# Patient Record
Sex: FEMALE | Race: WHITE | Employment: FULL TIME | URBAN - METROPOLITAN AREA
[De-identification: names, ages, dates, MRNs, and addresses within clinical notes are randomized per-mention and may not be internally consistent; named-entity substitution may affect disease eponyms.]

---

## 2017-09-29 ENCOUNTER — HOSPITAL ENCOUNTER (OUTPATIENT)
Dept: MRI IMAGING | Age: 32
Discharge: HOME | End: 2017-09-29
Attending: INTERNAL MEDICINE
Payer: COMMERCIAL

## 2017-09-29 VITALS — BODY MASS INDEX: 31.12 KG/M2 | WEIGHT: 220 LBS

## 2017-09-29 DIAGNOSIS — R63.5 ABNORMAL WEIGHT GAIN: ICD-10-CM

## 2017-09-29 DIAGNOSIS — D35.2 PITUITARY ADENOMA (HCC): ICD-10-CM

## 2017-09-29 DIAGNOSIS — R53.83 FATIGUE: ICD-10-CM

## 2017-09-29 PROCEDURE — 70553 MRI BRAIN STEM W/O & W/DYE: CPT

## 2017-09-29 PROCEDURE — 74011250636 HC RX REV CODE- 250/636: Performed by: INTERNAL MEDICINE

## 2017-09-29 PROCEDURE — A9577 INJ MULTIHANCE: HCPCS | Performed by: INTERNAL MEDICINE

## 2017-09-29 RX ADMIN — GADOBENATE DIMEGLUMINE 20 ML: 529 INJECTION, SOLUTION INTRAVENOUS at 09:20

## 2018-11-04 ENCOUNTER — HOSPITAL ENCOUNTER (EMERGENCY)
Age: 33
Discharge: HOME | End: 2018-11-04
Attending: EMERGENCY MEDICINE | Admitting: EMERGENCY MEDICINE
Payer: COMMERCIAL

## 2018-11-04 VITALS
SYSTOLIC BLOOD PRESSURE: 111 MMHG | TEMPERATURE: 98.3 F | OXYGEN SATURATION: 100 % | HEART RATE: 69 BPM | RESPIRATION RATE: 16 BRPM | DIASTOLIC BLOOD PRESSURE: 70 MMHG

## 2018-11-04 DIAGNOSIS — R00.2 PALPITATION: Primary | ICD-10-CM

## 2018-11-04 LAB
ALBUMIN SERPL-MCNC: 3.9 G/DL (ref 3.5–5)
ALBUMIN/GLOB SERPL: 1.1 {RATIO} (ref 1.1–2.2)
ALP SERPL-CCNC: 47 U/L (ref 45–117)
ALT SERPL-CCNC: 18 U/L (ref 12–78)
ANION GAP SERPL CALC-SCNC: 8 MMOL/L (ref 5–15)
APPEARANCE UR: ABNORMAL
AST SERPL-CCNC: 11 U/L (ref 15–37)
BACTERIA URNS QL MICRO: ABNORMAL /HPF
BASOPHILS # BLD: 0 K/UL (ref 0–0.1)
BASOPHILS NFR BLD: 0 % (ref 0–1)
BILIRUB SERPL-MCNC: 0.5 MG/DL (ref 0.2–1)
BILIRUB UR QL: NEGATIVE
BUN SERPL-MCNC: 14 MG/DL (ref 6–20)
BUN/CREAT SERPL: 16 (ref 12–20)
CALCIUM SERPL-MCNC: 8.6 MG/DL (ref 8.5–10.1)
CHLORIDE SERPL-SCNC: 107 MMOL/L (ref 97–108)
CO2 SERPL-SCNC: 25 MMOL/L (ref 21–32)
COLOR UR: ABNORMAL
CREAT SERPL-MCNC: 0.87 MG/DL (ref 0.55–1.02)
DIFFERENTIAL METHOD BLD: NORMAL
EOSINOPHIL # BLD: 0.2 K/UL (ref 0–0.4)
EOSINOPHIL NFR BLD: 3 % (ref 0–7)
EPITH CASTS URNS QL MICRO: ABNORMAL /LPF
ERYTHROCYTE [DISTWIDTH] IN BLOOD BY AUTOMATED COUNT: 12.3 % (ref 11.5–14.5)
GLOBULIN SER CALC-MCNC: 3.5 G/DL (ref 2–4)
GLUCOSE SERPL-MCNC: 83 MG/DL (ref 65–100)
GLUCOSE UR STRIP.AUTO-MCNC: NEGATIVE MG/DL
HCG UR QL: NEGATIVE
HCT VFR BLD AUTO: 36 % (ref 35–47)
HGB BLD-MCNC: 11.7 G/DL (ref 11.5–16)
HGB UR QL STRIP: ABNORMAL
IMM GRANULOCYTES # BLD: 0 K/UL (ref 0–0.04)
IMM GRANULOCYTES NFR BLD AUTO: 0 % (ref 0–0.5)
KETONES UR QL STRIP.AUTO: NEGATIVE MG/DL
LEUKOCYTE ESTERASE UR QL STRIP.AUTO: NEGATIVE
LYMPHOCYTES # BLD: 2.6 K/UL (ref 0.8–3.5)
LYMPHOCYTES NFR BLD: 43 % (ref 12–49)
MCH RBC QN AUTO: 29.8 PG (ref 26–34)
MCHC RBC AUTO-ENTMCNC: 32.5 G/DL (ref 30–36.5)
MCV RBC AUTO: 91.6 FL (ref 80–99)
MONOCYTES # BLD: 0.4 K/UL (ref 0–1)
MONOCYTES NFR BLD: 7 % (ref 5–13)
NEUTS SEG # BLD: 2.9 K/UL (ref 1.8–8)
NEUTS SEG NFR BLD: 48 % (ref 32–75)
NITRITE UR QL STRIP.AUTO: NEGATIVE
NRBC # BLD: 0 K/UL (ref 0–0.01)
NRBC BLD-RTO: 0 PER 100 WBC
PH UR STRIP: 5 [PH] (ref 5–8)
PLATELET # BLD AUTO: 187 K/UL (ref 150–400)
PMV BLD AUTO: 10.5 FL (ref 8.9–12.9)
POTASSIUM SERPL-SCNC: 3.4 MMOL/L (ref 3.5–5.1)
PROT SERPL-MCNC: 7.4 G/DL (ref 6.4–8.2)
PROT UR STRIP-MCNC: NEGATIVE MG/DL
RBC # BLD AUTO: 3.93 M/UL (ref 3.8–5.2)
RBC #/AREA URNS HPF: ABNORMAL /HPF (ref 0–5)
SODIUM SERPL-SCNC: 140 MMOL/L (ref 136–145)
SP GR UR REFRACTOMETRY: >1.03 (ref 1–1.03)
TROPONIN I SERPL-MCNC: <0.05 NG/ML
UR CULT HOLD, URHOLD: NORMAL
UROBILINOGEN UR QL STRIP.AUTO: 0.2 EU/DL (ref 0.2–1)
WBC # BLD AUTO: 6.1 K/UL (ref 3.6–11)
WBC URNS QL MICRO: ABNORMAL /HPF (ref 0–4)

## 2018-11-04 PROCEDURE — 36415 COLL VENOUS BLD VENIPUNCTURE: CPT | Performed by: EMERGENCY MEDICINE

## 2018-11-04 PROCEDURE — 81001 URINALYSIS AUTO W/SCOPE: CPT | Performed by: EMERGENCY MEDICINE

## 2018-11-04 PROCEDURE — 80053 COMPREHEN METABOLIC PANEL: CPT | Performed by: EMERGENCY MEDICINE

## 2018-11-04 PROCEDURE — 81025 URINE PREGNANCY TEST: CPT

## 2018-11-04 PROCEDURE — 93005 ELECTROCARDIOGRAM TRACING: CPT

## 2018-11-04 PROCEDURE — 99284 EMERGENCY DEPT VISIT MOD MDM: CPT

## 2018-11-04 PROCEDURE — 84484 ASSAY OF TROPONIN QUANT: CPT | Performed by: EMERGENCY MEDICINE

## 2018-11-04 PROCEDURE — 85025 COMPLETE CBC W/AUTO DIFF WBC: CPT | Performed by: EMERGENCY MEDICINE

## 2018-11-04 RX ORDER — IBUPROFEN 400 MG/1
400 TABLET ORAL
Status: DISCONTINUED | OUTPATIENT
Start: 2018-11-04 | End: 2018-11-05 | Stop reason: HOSPADM

## 2018-11-05 LAB
ATRIAL RATE: 87 BPM
CALCULATED P AXIS, ECG09: 58 DEGREES
CALCULATED R AXIS, ECG10: 96 DEGREES
CALCULATED T AXIS, ECG11: 48 DEGREES
DIAGNOSIS, 93000: NORMAL
P-R INTERVAL, ECG05: 168 MS
Q-T INTERVAL, ECG07: 380 MS
QRS DURATION, ECG06: 94 MS
QTC CALCULATION (BEZET), ECG08: 457 MS
VENTRICULAR RATE, ECG03: 87 BPM

## 2018-11-05 NOTE — ED PROVIDER NOTES
27 y.o. female with past medical history significant for HTN, migraines, panic attacks, anemia, ADHD, who presents ambulatory to the ED with cc of palpitations. Pt reports 4-week history of intermittent palpitations described as a \"flip-flop\" sensation lasting \"for a second\" and followed by \"a gasping breath. \" She states today the palpitations increased in frequency, with most recent occurrence during EKG reading in the ED. Pt denies high intake of caffeine, noting she has had 1 cup of decaf coffee today. She endorses use of Vyvanse for ADHD x 1 year. At present, pt denies any palpitations but endorses mild shortness of breath described as \"I need to take a deeper breath while breathing. \" She adds she has been feeling thirsty recently despite intake of water. Of note, pt was seen by cardiology \"last year or the previous year\" and was placed on a 30-day event monitor because her son has a \"heart defect. \" She states she was informed by the cardiologist that the results were negative and that \"I'm stressed. \" Pt denies use of birth control, Mirena, or other hormone drugs. Pt specifically denies any chest pain, leg pain, leg swelling, or abdominal pain. There are no other acute medical concerns at this time. Social Hx: Never Tobacco use, denies EtOH use Significant FMHx: son has \"heart defect\" PCP: Rosalind Meza MD 
 
Note written by Nemo Stern, as dictated by Johnny Darden MD 7:30 PM 
 
 
 
The history is provided by the patient. No  was used. Past Medical History:  
Diagnosis Date  Abnormal Pap smear 2004  
 colposcopy done and they have been normal since  Abnormal Papanicolaou smear of cervix 2004  
 colpo, WNL after  ADHD  Anemia NEC   
 has been on iron in the past, but currently not taking extra iron  Essential hypertension   
 pih at 36 weeks with last pregnancy. IOL at 38 weeks  Migraines  Panic attacks  Postpartum depression  Psychiatric problem   
 anxiety/depression, denies taking meds at this time but states has taken Lexipro and Cellexa in the past.  
 
 
Past Surgical History:  
Procedure Laterality Date  HX OTHER SURGICAL  2001  
 wisdom teeth ext. [address] R leg, bone cyst removed Family History:  
Problem Relation Age of Onset  Cancer Maternal Grandfather 79  
     pancreatic  Anxiety Father  Depression Father  Hypertension Father  Arthritis-osteo Father  High Cholesterol Maternal Grandmother  Arthritis-osteo Maternal Grandmother  Cancer Paternal Grandmother   
     lung  Heart Disease Paternal Grandfather  Cancer Maternal Aunt   
     breast  
 
 
Social History Socioeconomic History  Marital status:  Spouse name: Not on file  Number of children: Not on file  Years of education: Not on file  Highest education level: Not on file Social Needs  Financial resource strain: Not on file  Food insecurity - worry: Not on file  Food insecurity - inability: Not on file  Transportation needs - medical: Not on file  Transportation needs - non-medical: Not on file Occupational History  Not on file Tobacco Use  Smoking status: Never Smoker  Smokeless tobacco: Never Used Substance and Sexual Activity  Alcohol use: No  
 Drug use: Not on file  Sexual activity: Yes  
  Partners: Male Birth control/protection: None Other Topics Concern  Not on file Social History Narrative  Not on file ALLERGIES: Latex and Morphine Review of Systems Respiratory: Positive for shortness of breath. Cardiovascular: Positive for palpitations. Negative for chest pain and leg swelling. Gastrointestinal: Negative for abdominal pain. Musculoskeletal: Negative for myalgias. All other systems reviewed and are negative. Vitals:  
 11/04/18 1903 Pulse: (!) 120  
 SpO2: 100% Physical Exam  
Constitutional: She is oriented to person, place, and time. She appears well-developed and well-nourished. No distress. HENT:  
Head: Normocephalic and atraumatic. Eyes: Conjunctivae are normal. No scleral icterus. Neck: Neck supple. No tracheal deviation present. Cardiovascular: Normal rate, regular rhythm, normal heart sounds and intact distal pulses. Exam reveals no gallop and no friction rub. No murmur heard. Pulmonary/Chest: Effort normal and breath sounds normal. She has no wheezes. She has no rales. Abdominal: Soft. She exhibits no distension. There is no tenderness. There is no rebound and no guarding. Musculoskeletal: She exhibits no edema. Neurological: She is alert and oriented to person, place, and time. Skin: Skin is warm and dry. No rash noted. Psychiatric: She has a normal mood and affect. Nursing note and vitals reviewed. Note written by Nemo Moss, as dictated by Morgan Dumont MD 7:30 PM 
 
MDM Procedures ED EKG interpretation: 
NSR, rate 87, no ST changes.  
Note written by Nemo Moss, as dictated by Morgan Dumont MD 7:39 PM

## 2018-11-05 NOTE — ED NOTES
MD reviewed discharge instructions and options with patient and patient verbalized understanding. RN reviewed discharge instructions using teachback method. Pt ambulated to exit without difficulty and in no signs of acute distress. Ambulatory out of department. No complaints or needs expressed at this time. Patient was counseled on medications prescribed at discharge. VSS, verbalized relief from most intense pain. Patient to call pcp 
 in the morning for appointment.

## 2018-11-05 NOTE — ED TRIAGE NOTES
Over the last four weeks, pt has been feeling heart palpitations that lead her to cough- \"it feels like a flip flop\". Pt in no visible distress, ambulatory, a&ox3. Pt reports an increase in palpitations this evening. Pt had 1 decaf of coffee today. Pt on Vyvanse for 1 year. Pt denies CP currently, states she feels mildly sob when talking. Pt has had a holter monitor in the past and there were no significant events noted.

## 2018-11-05 NOTE — DISCHARGE INSTRUCTIONS
We hope that we have addressed all of your medical concerns. The examination and treatment you received in the Emergency Department were for an emergent problem and were not intended as complete care. It is important that you follow up with your healthcare provider(s) for ongoing care. If your symptoms worsen or do not improve as expected, and you are unable to reach your usual health care provider(s), you should return to the Emergency Department. Today's healthcare is undergoing tremendous change, and patient satisfaction surveys are one of the many tools to assess the quality of medical care. You may receive a survey from the [employer] regarding your experience in the Emergency Department. I hope that your experience has been completely positive, particularly the medical care that I provided. As such, please participate in the survey; anything less than excellent does not meet my expectations or intentions. formerly [facility] and [address] [place] [address] participate in nationally recognized quality of care measures. If your blood pressure is greater than 120/80, as reported below, we urge that you seek medical care to address the potential of high blood pressure, commonly known as hypertension. Hypertension can be hereditary or can be caused by certain medical conditions, pain, stress, or \"white coat syndrome. \"       Please make an appointment with your health care provider(s) for follow up of your Emergency Department visit. VITALS:   Patient Vitals for the past 8 hrs:   Temp Pulse Resp BP SpO2   11/04/18 1935 98.3 °F (36.8 °C) 83 18 144/90 100 %   11/04/18 1903 -- (!) 120 -- -- 100 %          Thank you for allowing us to provide you with medical care today. We realize that you have many choices for your emergency care needs. Please choose us in the future for any continued health care needs. Hildred Capron Magnant, MD    [address] [facility].   Office: [phone]            Recent Results (from the past 24 hour(s))   CBC WITH AUTOMATED DIFF    Collection Time: 11/04/18  7:37 PM   Result Value Ref Range    WBC 6.1 3.6 - 11.0 K/uL    RBC 3.93 3.80 - 5.20 M/uL    HGB 11.7 11.5 - 16.0 g/dL    HCT 36.0 35.0 - 47.0 %    MCV 91.6 80.0 - 99.0 FL    MCH 29.8 26.0 - 34.0 PG    MCHC 32.5 30.0 - 36.5 g/dL    RDW 12.3 11.5 - 14.5 %    PLATELET 526 748 - 099 K/uL    MPV 10.5 8.9 - 12.9 FL    NRBC 0.0 0  WBC    ABSOLUTE NRBC 0.00 0.00 - 0.01 K/uL    NEUTROPHILS 48 32 - 75 %    LYMPHOCYTES 43 12 - 49 %    MONOCYTES 7 5 - 13 %    EOSINOPHILS 3 0 - 7 %    BASOPHILS 0 0 - 1 %    IMMATURE GRANULOCYTES 0 0.0 - 0.5 %    ABS. NEUTROPHILS 2.9 1.8 - 8.0 K/UL    ABS. LYMPHOCYTES 2.6 0.8 - 3.5 K/UL    ABS. MONOCYTES 0.4 0.0 - 1.0 K/UL    ABS. EOSINOPHILS 0.2 0.0 - 0.4 K/UL    ABS. BASOPHILS 0.0 0.0 - 0.1 K/UL    ABS. IMM. GRANS. 0.0 0.00 - 0.04 K/UL    DF AUTOMATED     METABOLIC PANEL, COMPREHENSIVE    Collection Time: 11/04/18  7:37 PM   Result Value Ref Range    Sodium 140 136 - 145 mmol/L    Potassium 3.4 (L) 3.5 - 5.1 mmol/L    Chloride 107 97 - 108 mmol/L    CO2 25 21 - 32 mmol/L    Anion gap 8 5 - 15 mmol/L    Glucose 83 65 - 100 mg/dL    BUN 14 6 - 20 MG/DL    Creatinine 0.87 0.55 - 1.02 MG/DL    BUN/Creatinine ratio 16 12 - 20      GFR est AA >60 >60 ml/min/1.73m2    GFR est non-AA >60 >60 ml/min/1.73m2    Calcium 8.6 8.5 - 10.1 MG/DL    Bilirubin, total 0.5 0.2 - 1.0 MG/DL    ALT (SGPT) 18 12 - 78 U/L    AST (SGOT) 11 (L) 15 - 37 U/L    Alk.  phosphatase 47 45 - 117 U/L    Protein, total 7.4 6.4 - 8.2 g/dL    Albumin 3.9 3.5 - 5.0 g/dL    Globulin 3.5 2.0 - 4.0 g/dL    A-G Ratio 1.1 1.1 - 2.2     TROPONIN I    Collection Time: 11/04/18  7:37 PM   Result Value Ref Range    Troponin-I, Qt. <0.05 <0.05 ng/mL   URINALYSIS W/MICROSCOPIC    Collection Time: 11/04/18  7:47 PM   Result Value Ref Range    Color YELLOW/STRAW Appearance CLOUDY (A) CLEAR      Specific gravity >1.030 (H) 1.003 - 1.030    pH (UA) 5.0 5.0 - 8.0      Protein NEGATIVE  NEG mg/dL    Glucose NEGATIVE  NEG mg/dL    Ketone NEGATIVE  NEG mg/dL    Bilirubin NEGATIVE  NEG      Blood TRACE (A) NEG      Urobilinogen 0.2 0.2 - 1.0 EU/dL    Nitrites NEGATIVE  NEG      Leukocyte Esterase NEGATIVE  NEG      WBC 0-4 0 - 4 /hpf    RBC 0-5 0 - 5 /hpf    Epithelial cells MANY (A) FEW /lpf    Bacteria 1+ (A) NEG /hpf   URINE CULTURE HOLD SAMPLE    Collection Time: 11/04/18  7:47 PM   Result Value Ref Range    Urine culture hold        URINE ON HOLD IN MICROBIOLOGY DEPT FOR 3 DAYS. IF UNPRESERVED URINE IS SUBMITTED, IT CANNOT BE USED FOR ADDITIONAL TESTING AFTER 24 HRS, RECOLLECTION WILL BE REQUIRED. HCG URINE, QL. - POC    Collection Time: 11/04/18  7:48 PM   Result Value Ref Range    Pregnancy test,urine (POC) NEGATIVE  NEG         No results found.

## 2019-07-01 LAB
ANTIBODY SCREEN, EXTERNAL: NEGATIVE
CHLAMYDIA, EXTERNAL: NEGATIVE
HBSAG, EXTERNAL: NEGATIVE
HIV, EXTERNAL: NONREACTIVE
N. GONORRHEA, EXTERNAL: NEGATIVE
T. PALLIDUM, EXTERNAL: NONREACTIVE
TYPE, ABO & RH, EXTERNAL: NORMAL

## 2020-01-24 ENCOUNTER — HOSPITAL ENCOUNTER (OUTPATIENT)
Age: 35
Setting detail: OBSERVATION
Discharge: HOME | End: 2020-01-24
Attending: OBSTETRICS & GYNECOLOGY | Admitting: OBSTETRICS & GYNECOLOGY
Payer: COMMERCIAL

## 2020-01-24 VITALS
HEIGHT: 70 IN | DIASTOLIC BLOOD PRESSURE: 70 MMHG | BODY MASS INDEX: 37.22 KG/M2 | WEIGHT: 260 LBS | RESPIRATION RATE: 16 BRPM | TEMPERATURE: 98.5 F | HEART RATE: 77 BPM | SYSTOLIC BLOOD PRESSURE: 122 MMHG

## 2020-01-24 PROBLEM — Z34.90 PREGNANCY: Status: ACTIVE | Noted: 2020-01-24

## 2020-01-24 LAB
A1 MICROGLOB PLACENTAL VAG QL: NEGATIVE
CONTROL LINE PRESENT?: NORMAL
DAILY QC (YES/NO)?: YES
EXPIRATION DATE: NORMAL
INTERNAL NEGATIVE CONTROL: NORMAL
KIT LOT NO.: NORMAL
PH, VAGINAL FLUID: NEGATIVE (ref 5–6.1)

## 2020-01-24 PROCEDURE — 99285 EMERGENCY DEPT VISIT HI MDM: CPT

## 2020-01-24 PROCEDURE — 83986 ASSAY PH BODY FLUID NOS: CPT | Performed by: ADVANCED PRACTICE MIDWIFE

## 2020-01-24 PROCEDURE — 84112 EVAL AMNIOTIC FLUID PROTEIN: CPT | Performed by: ADVANCED PRACTICE MIDWIFE

## 2020-01-24 RX ORDER — ZINC GLUCONATE 10 MG
LOZENGE ORAL
COMMUNITY

## 2020-01-24 NOTE — DISCHARGE INSTRUCTIONS
You came to the hospital because you thought you were in labor. This information may help you decide when you need to call your provider and return to the hospital.     Am I in Labor? ?  While each woman may feel contractions differently, these facts may help you decide if you are in true labor. A contraction is a painful tightening of the uterus. It may feel like the baby is sitting up, a bad backache or severe menstrual cramps. Sometimes women have contractions that do not cause cervical change- this is often called \"false labor. \"      SIGNS AND SYMPTOMS OF LABOR   Uterine cramping   Diarrhea   Increase or change in vaginal discharge   Low abdominal pressure   Dull low backache   Feeling like your baby is pushing down    When these symptoms are experienced. .. STOP what you are doing, lie on your side, drink two or three glasses of water or juice, and wait one hour. If the symptoms worsen call your care provider. If the symptoms go away, inform your care provider at the next visit that this happened. In TRUE LABOR contractions: In FALSE LABOR contractions:   * Occur regularly every 5 min or less * Occur irregularly   * Feel stronger and hurt more * Stay the same or space out   * Become stronger with walking * Strength stays the same or they hurt less  when walking   * Pinkish mucus or spotting maybe present          Call your provider if:   Regular, painful contractions every 5 minutes or less for one hour. Time your contractions.   You have a gush of fluid from your vagina.  Vaginal bleeding that is bright red, like a period (it is normal to have spotting after a vaginal exam or intercourse).   Your baby is not moving as much as usual- at least 4 fetal movements in 1 hour after drinking and resting on your side for 1 hour.  Fever 101 or above taken orally. Follow Up Appointment:  {follow up:[id number]} with Dr. Astudillo at the office.       Medications: Continue prenatal medications and/or any new medications    Patient Education        Weeks 34 to 39 of Your Pregnancy: Care Instructions  Your Care Instructions    By now, your baby and your belly have grown quite large. It is almost time to give birth. Your baby's lungs are almost ready to breathe air. The bones in your baby's head are now firm enough to protect it, but soft enough to move down through the birth canal.  You may feel excited, happy, anxious, or scared. You may wonder how you will know if you are in labor or what to expect during labor. Try to be flexible in your expectations of the birth. Because each birth is different, there is no way to know exactly what childbirth will be like for you. This care sheet will help you know what to expect and how to prepare. This may make your childbirth easier. If you haven't already had the Tdap shot during this pregnancy, talk to your doctor about getting it. It will help protect your  against pertussis infection. In the 36th week, most women have a test for group B streptococcus (GBS). GBS is a common bacteria that can live in the vagina and rectum. It can make your baby sick after birth. If you test positive, you will get antibiotics during labor. The medicine will keep your baby from getting the bacteria. Follow-up care is a key part of your treatment and safety. Be sure to make and go to all appointments, and call your doctor if you are having problems. It's also a good idea to know your test results and keep a list of the medicines you take. How can you care for yourself at home? Learn about pain relief choices  · Pain is different for every woman. Talk with your doctor about your feelings about pain. · You can choose from several types of pain relief. These include medicine or breathing techniques, as well as comfort measures. You can use more than one option. · If you choose to have pain medicine during labor, talk to your doctor about your options.  Some medicines lower anxiety and help with some of the pain. Others make your lower body numb so that you won't feel pain. · Be sure to tell your doctor about your pain medicine choice before you start labor or very early in your labor. You may be able to change your mind as labor progresses. · Rarely, a woman is put to sleep by medicine given through a mask or an IV. Labor and delivery  · The first stage of labor has three parts: early, active, and transition. ? Most women have early labor at home. You can stay busy or rest, eat light snacks, drink clear fluids, and start counting contractions. ? When talking during a contraction gets hard, you may be moving to active labor. During active labor, you should head for the hospital if you are not there already. ? You are in active labor when contractions come every 3 to 4 minutes and last about 60 seconds. Your cervix is opening more rapidly. ? If your water breaks, contractions will come faster and stronger. ? During transition, your cervix is stretching, and contractions are coming more rapidly. ? You may want to push, but your cervix might not be ready. Your doctor will tell you when to push. · The second stage starts when your cervix is completely opened and you are ready to push. ? Contractions are very strong to push the baby down the birth canal.  ? You will feel the urge to push. You may feel like you need to have a bowel movement. ? You may be coached to push with contractions. These contractions will be very strong, but you will not have them as often. You can get a little rest between contractions. ? You may be emotional and irritable. You may not be aware of what is going on around you.  ? One last push, and your baby is born. · The third stage is when a few more contractions push out the placenta. This may take 30 minutes or less. · The fourth stage is the welcome recovery. You may feel overwhelmed with emotions and exhausted but alert.  This is a good time to start breastfeeding. Where can you learn more? Go to http://tello-iza.info/. Enter D783 in the search box to learn more about \"Weeks 34 to 36 of Your Pregnancy: Care Instructions. \"  Current as of: May 29, 2019  Content Version: 12.2  © 0889-8205 [employer]. Care instructions adapted under license by [employer] (which disclaims liability or warranty for this information). If you have questions about a medical condition or this instruction, always ask your healthcare professional. [address] any warranty or liability for your use of this information.

## 2020-01-24 NOTE — PROGRESS NOTES
1/24/2020  2:31 AM  Pt arrived to unit from home. Pt oriented to room 8 and placed on EFM. Pt states she had a gush of fluid around 0205. Denies vaginal bleeding, or contractions. Nitrazine negative, amnisure completed. 0310:  Amnisure result negative  0323:  LATASHA Bronson CNM at bedside. Speculum exam completed. 1258:  Reviewed discharge instructions with pt and . All questions answered.

## 2020-01-24 NOTE — H&P
History and Physical    Patient: Tila Alonso MRN: [id number]  SSN: [id number]    YOB: 1985  Age: 37 y.o. Sex: female      Subjective:      Tila Alonso is a 37 y.o. female who  at 29w2d with an SERENITY of 20. She presents to l and d for possible SROM at 0200 this morning. Reports she was sleeping and felt a large gush of fluid. She has continued to feel wet since that time, wore a pad into labor and delivery, it was not saturated on arrival, but moist. Reports fluid has been clear and odorless. Denies ctx and vb, endorses good fetal movement. Pregnancy complicated by history of  (for NRFS), desires . Has had one prior VAVD prior to . Pt with history of son with tricuspid atresia, scans normal for this pregnancy. Pt also with history of PPD, will start medication PP, see therapist. Pt AMA and obese. Anemic- on supplementation. Past Medical History:   Diagnosis Date    Abnormal Pap smear 2004    colposcopy done and they have been normal since    Abnormal Papanicolaou smear of cervix 2004    colpo, WNL after    ADHD     Anemia NEC     has been on iron in the past, but currently not taking extra iron    Essential hypertension     pih at 36 weeks with last pregnancy. IOL at 38 weeks    Migraines     Panic attacks     Postpartum depression     1st pregnancy - no meds    Psychiatric problem     anxiety/depression, denies taking meds at this time but states has taken Lexipro and Cellexa in the past.     Past Surgical History:   Procedure Laterality Date    HX  SECTION      2016    HX OTHER SURGICAL      wisdom teeth ext.     HX OTHER SURGICAL      R leg, bone cyst removed      Family History   Problem Relation Age of Onset    Cancer Maternal Grandfather 79        pancreatic    Anxiety Father     Depression Father     Hypertension Father     Arthritis-osteo Father     High Cholesterol Maternal Grandmother     Arthritis-osteo Maternal Grandmother     Cancer Paternal Grandmother         lung    Heart Disease Paternal Grandfather     Cancer Maternal Aunt         breast     Social History     Tobacco Use    Smoking status: Never Smoker    Smokeless tobacco: Never Used   Substance Use Topics    Alcohol use: No      Prior to Admission medications    Medication Sig Start Date End Date Taking? Authorizing Provider   magnesium 250 mg tab Take  by mouth. Yes Provider, Historical   PNV#16-Iron Fum & PS-FA-OM-3 (CONCEPT DHA) 35-1-200 mg cap Take  by mouth. Yes Provider, Historical   prenatal multivit-ca-min-fe-fa Tab Take  by mouth daily. Indications: PREGNANCY   Yes Provider, Historical   B.infantis-B.ani-B.long-B.bifi (PROBIOTIC 4X) 10-15 mg TbEC Take  by mouth. Yes Provider, Historical        Allergies   Allergen Reactions    Latex Rash    Morphine Shortness of Breath and Rash       Review of Systems:  A comprehensive review of systems was negative except for that written in the History of Present Illness. Objective:     Vitals:    01/24/20 0242   Weight: 117.9 kg (260 lb)   Height: 5' 10\" (1.778 m)        Physical Exam:  GENERAL: alert, cooperative, no distress, appears stated age  LUNG: clear to auscultation bilaterally  HEART: regular rate and rhythm, S1, S2 normal, no murmur, click, rub or gallop  ABDOMEN: soft, non-tender.  Bowel sounds normal. No masses,  no organomegaly, gravid  EXTREMITIES:  extremities normal, atraumatic, no cyanosis or edema  SKIN: Normal.  NEUROLOGIC: negative  PSYCHIATRIC: WNL    SVE: Digital exam deferred- no indication at this time    Sterile speculum exam: No pooling, perineum dry    Amnisure and nitrazine negative    NST: Monitored for 20 minutes, reactive, cat 1, baseline: 140, positive accels, no decels, moderate variability, no ctx    Assessment:     Hospital Problems  Date Reviewed: 12/4/2019          Codes Class Noted POA    Pregnancy ICD-10-CM: Z34.90  ICD-9-CM: V22.2  1/24/2020 Unknown Possible SROM: Ruled out  Previous - desires     Plan:     Admit to observation  NST, Nitrazine, Amnisure  Sterile speculum exam to rule out pooling    Reviewed negative testing for SROM and s/s of SROM. Discussed returning with any new or worsening symptoms. Pt verbalized understanding. Also reviewed s/s of VB, ctx and decreased fetal movement, return with any concerns. Discharged home. Keep next routine OB appt.      Signed By: Leonardo Patel CNM     2020

## 2020-01-31 LAB — GRBS, EXTERNAL: NEGATIVE

## 2020-02-01 ENCOUNTER — HOSPITAL ENCOUNTER (OUTPATIENT)
Age: 35
Setting detail: OBSERVATION
Discharge: HOME | End: 2020-02-01
Attending: OBSTETRICS & GYNECOLOGY | Admitting: OBSTETRICS & GYNECOLOGY
Payer: COMMERCIAL

## 2020-02-01 VITALS
HEART RATE: 90 BPM | RESPIRATION RATE: 16 BRPM | TEMPERATURE: 98.2 F | HEIGHT: 70 IN | BODY MASS INDEX: 37.8 KG/M2 | DIASTOLIC BLOOD PRESSURE: 62 MMHG | SYSTOLIC BLOOD PRESSURE: 127 MMHG | WEIGHT: 264 LBS

## 2020-02-01 PROCEDURE — 99285 EMERGENCY DEPT VISIT HI MDM: CPT

## 2020-02-01 PROCEDURE — 99218 HC RM OBSERVATION: CPT

## 2020-02-01 RX ORDER — FACIAL-BODY WIPES
10 EACH TOPICAL ONCE
Status: DISCONTINUED | OUTPATIENT
Start: 2020-02-01 | End: 2020-02-01 | Stop reason: HOSPADM

## 2020-02-02 NOTE — H&P
History and Physical    Patient: Elizabeth Rader MRN: [id number]  SSN: [id number]    YOB: 1985  Age: 37 y.o. Sex: female      Subjective:      Elizabeth Rader is a 37 y.o. female [id number] at 44w9d with an SERENITY of 20. She presents to labor and delivery for decreased fetal movement. Reports she was home, with her two other children (they were arguing) and she realized she had not felt the fetus move. She drank some cold water, ate a brownie and laid down on the couch. She felt fetal movements, but they were more fluid and butterfly flutter feeling than normal. Reports she usually feels distinct fetal kicks and movements. She was concerned because this is a difference from her normal. Denies LOF, VB and ctx. Reports feeling fetal movement since arrival to labor and delivery. Pregnancy complicated by history of  (for NRFS), desires . Has had one prior VAVD prior to . Pt with history of son with tricuspid atresia, scans normal for this pregnancy. Pt also with history of PPD, will start medication PP, see therapist. Pt AMA and obese. Anemic- on supplementation. Past Medical History:   Diagnosis Date    Abnormal Pap smear 2004    colposcopy done and they have been normal since    Abnormal Papanicolaou smear of cervix 2004    colpo, WNL after    ADHD     Anemia NEC     has been on iron in the past, but currently not taking extra iron    Essential hypertension     pih at 36 weeks with last pregnancy. IOL at 38 weeks    Migraines     Panic attacks     Postpartum depression     1st pregnancy - no meds    Psychiatric problem     anxiety/depression, denies taking meds at this time but states has taken Lexipro and Cellexa in the past.     Past Surgical History:   Procedure Laterality Date    HX  SECTION          HX OTHER SURGICAL      wisdom teeth ext.     HX OTHER SURGICAL      R leg, bone cyst removed      Family History   Problem Relation Age of Onset    Cancer Maternal Grandfather 79        pancreatic    Anxiety Father     Depression Father     Hypertension Father     Arthritis-osteo Father     High Cholesterol Maternal Grandmother     Arthritis-osteo Maternal Grandmother     Cancer Paternal Grandmother         lung    Heart Disease Paternal Grandfather     Cancer Maternal Aunt         breast     Social History     Tobacco Use    Smoking status: Never Smoker    Smokeless tobacco: Never Used   Substance Use Topics    Alcohol use: No      Prior to Admission medications    Medication Sig Start Date End Date Taking? Authorizing Provider   magnesium 250 mg tab Take  by mouth. Yes Provider, Historical   PNV#16-Iron Fum & PS-FA-OM-3 (CONCEPT DHA) 35-1-200 mg cap Take  by mouth. Yes Provider, Historical   prenatal multivit-ca-min-fe-fa Tab Take  by mouth daily. Indications: PREGNANCY   Yes Provider, Historical   B.infantis-B.ani-B.long-B.bifi (PROBIOTIC 4X) 10-15 mg TbEC Take  by mouth. Yes Provider, Historical        Allergies   Allergen Reactions    Latex Rash    Morphine Shortness of Breath and Rash       Review of Systems:  A comprehensive review of systems was negative except for that written in the History of Present Illness. Objective:     Vitals:    02/01/20 1927 02/01/20 1932   BP:  127/62   Pulse:  90   Resp:  16   Temp:  98.2 °F (36.8 °C)   Weight: 119.7 kg (264 lb)    Height: 5' 10\" (1.778 m)         Physical Exam:  GENERAL: alert, cooperative, no distress, appears stated age  LUNG: clear to auscultation bilaterally  HEART: regular rate and rhythm, S1, S2 normal, no murmur, click, rub or gallop  ABDOMEN: soft, non-tender. Bowel sounds normal. No masses,  no organomegaly, gravid  EXTREMITIES:  extremities normal, atraumatic, no cyanosis or edema  SKIN: Normal.  NEUROLOGIC: negative  PSYCHIATRIC: WNL    SVE: Difficult, cervix posterior, large amount of fecal maternal palpable in rectum.     NST: Monitored for more than 20 minutes, reactive, cat 1, baseline 135, positive accels, no decels, moderate variability, uterine irritability noted: improved with PO hydration    Assessment:     Hospital Problems  Date Reviewed: 12/4/2019          Codes Class Noted POA    Pregnancy ICD-10-CM: Z34.90  ICD-9-CM: V22.2  1/24/2020 Unknown            Decreased fetal movement: resolved  Constipation    Plan:     Admit to observation. NST.   SVE per client request.  Reviewed reactive NST and reassurance of fetal wellbeing. Reviewed constipation and need for increased PO hydration, stool softener. Will give bisacodyl suppository to help. Pt verbalized understanding. Discharged home with strict FKC, reviewed LOF, VB and ctx precautions. Return with any concerns. Keep next routine OB appt.      Signed By: Debbie Hamilton CNM     February 1, 2020

## 2020-02-02 NOTE — DISCHARGE INSTRUCTIONS
DECREASED FETAL MOVEMENT DISCHARGE INSTRUCTIONS    Name: Nicko Hardin  YOB: 1985  Primary Diagnosis: Active Problems:    Pregnancy (2020)        Introduction: You came to the hospital because your baby is not moving as much as usual. This information may help you decide when you need to call your care provider and return to the hospital. There are several possible reasons your baby's movements have decreased. Sometimes, the baby is simply asleep. Many times the baby simply needs extra fluids. You can provide this by lying down on your side (to increase blood flow to the womb) and drinking a large glass of fluid. If this does not make the baby move four times in one hour, you need to contact your care provider as soon as possible. General:     none    Diet/Diet Restrictions:      Anything you like/tolerate, Follow your regularly prescribed diet and Drink 8-10 glasses of water each day. Avoid beverages with caffeine. Physical Activity / Restrictions / Safety:     As tolerated. Other:        Discharge Instructions/ Special Treatment/ Home Care Needs:     Call your provider if:   Your baby is not moving as much as usual-at least 4 fetal movements in 1 hour after drinking and resting on your side for 1 hour.  You have regular, painful contractions every 5 minutes or less for one hour. Time your contractions from the beginning of one to the beginning of the next.  You have a gush of fluid or blood from your vagina (it is normal to have spotting after vaginal exam or intercourse).    Other:    labor instructions:    Uterine cramping (menstrual-like cramps, intermittent or constant   Uterine contractions every 10-15 minutes or more frequently   Low abdominal pressure (pelvic pressure)   Dull low backache (intermittent or constant)   Increase or change in vaginal discharge   Feeling that the baby is \"pushing down\"   Abdominal cramping with or without diarrhea  If any of these symptoms are experienced, stop what you are doing, lie down on your side, drink two to three glasses of water and wait one hour. If the symptoms persist or get worse, call your provider. Pain Management:             Discharge Checklist-NURSING TO COMPLETE:     Date and Time of Discharge: Date: 2/1/2020 Time: 8:24 PM    Return of:   Dental Appliance:    Vision:    Hearing Aid:    Jewelry:    Clothing:    Other Valuables:    Valuables sent to safe:      Prescription Given: no  Medication Instruction Sheet(s), including side effects, provided: yes    Accompanied By: Family    Mode of Transportation:    Discharge Disposition: Home    I have had the opportunity to make my options or choices for discharge. I have received and understand these instructions.

## 2020-02-02 NOTE — PROGRESS NOTES
1935 Bedside report received from [address] [place] [address] at bedside. Plan to discharge home. 2045 Discharge home.

## 2020-02-02 NOTE — PROGRESS NOTES
1858 Pt arrived ambulatory from home with decreased fetal movement today. Reports possibly leaking clear fluid as well.

## 2020-02-09 ENCOUNTER — HOSPITAL ENCOUNTER (EMERGENCY)
Age: 35
Discharge: HOME | End: 2020-02-09
Attending: OBSTETRICS & GYNECOLOGY | Admitting: OBSTETRICS & GYNECOLOGY
Payer: COMMERCIAL

## 2020-02-09 VITALS
BODY MASS INDEX: 37.65 KG/M2 | SYSTOLIC BLOOD PRESSURE: 116 MMHG | HEIGHT: 70 IN | TEMPERATURE: 98.8 F | HEART RATE: 102 BPM | WEIGHT: 263 LBS | OXYGEN SATURATION: 99 % | DIASTOLIC BLOOD PRESSURE: 60 MMHG | RESPIRATION RATE: 18 BRPM

## 2020-02-09 LAB
ALBUMIN SERPL-MCNC: 2.6 G/DL (ref 3.5–5)
ALBUMIN/GLOB SERPL: 0.7 {RATIO} (ref 1.1–2.2)
ALP SERPL-CCNC: 99 U/L (ref 45–117)
ALT SERPL-CCNC: 17 U/L (ref 12–78)
ANION GAP SERPL CALC-SCNC: 8 MMOL/L (ref 5–15)
APPEARANCE UR: CLEAR
AST SERPL-CCNC: 36 U/L (ref 15–37)
BACTERIA URNS QL MICRO: NEGATIVE /HPF
BILIRUB SERPL-MCNC: 0.4 MG/DL (ref 0.2–1)
BILIRUB UR QL: NEGATIVE
BUN SERPL-MCNC: 5 MG/DL (ref 6–20)
BUN/CREAT SERPL: 8 (ref 12–20)
CALCIUM SERPL-MCNC: 8.5 MG/DL (ref 8.5–10.1)
CHLORIDE SERPL-SCNC: 110 MMOL/L (ref 97–108)
CO2 SERPL-SCNC: 19 MMOL/L (ref 21–32)
COLOR UR: NORMAL
CREAT SERPL-MCNC: 0.62 MG/DL (ref 0.55–1.02)
EPITH CASTS URNS QL MICRO: NORMAL /LPF
ERYTHROCYTE [DISTWIDTH] IN BLOOD BY AUTOMATED COUNT: 13.5 % (ref 11.5–14.5)
FLUAV AG NPH QL IA: NEGATIVE
FLUBV AG NOSE QL IA: NEGATIVE
GLOBULIN SER CALC-MCNC: 3.7 G/DL (ref 2–4)
GLUCOSE SERPL-MCNC: 99 MG/DL (ref 65–100)
GLUCOSE UR STRIP.AUTO-MCNC: NEGATIVE MG/DL
HCT VFR BLD AUTO: 32.8 % (ref 35–47)
HGB BLD-MCNC: 10.6 G/DL (ref 11.5–16)
HGB UR QL STRIP: NEGATIVE
HYALINE CASTS URNS QL MICRO: NORMAL /LPF (ref 0–5)
KETONES UR QL STRIP.AUTO: NEGATIVE MG/DL
LEUKOCYTE ESTERASE UR QL STRIP.AUTO: NEGATIVE
MCH RBC QN AUTO: 29.4 PG (ref 26–34)
MCHC RBC AUTO-ENTMCNC: 32.3 G/DL (ref 30–36.5)
MCV RBC AUTO: 91.1 FL (ref 80–99)
NITRITE UR QL STRIP.AUTO: NEGATIVE
NRBC # BLD: 0 K/UL (ref 0–0.01)
NRBC BLD-RTO: 0 PER 100 WBC
PH UR STRIP: 6.5 [PH] (ref 5–8)
PLATELET # BLD AUTO: 165 K/UL (ref 150–400)
PMV BLD AUTO: 11.3 FL (ref 8.9–12.9)
POTASSIUM SERPL-SCNC: 4.6 MMOL/L (ref 3.5–5.1)
PROT SERPL-MCNC: 6.3 G/DL (ref 6.4–8.2)
PROT UR STRIP-MCNC: NEGATIVE MG/DL
RBC # BLD AUTO: 3.6 M/UL (ref 3.8–5.2)
RBC #/AREA URNS HPF: NORMAL /HPF (ref 0–5)
SODIUM SERPL-SCNC: 137 MMOL/L (ref 136–145)
SP GR UR REFRACTOMETRY: 1.01 (ref 1–1.03)
UA: UC IF INDICATED,UAUC: NORMAL
UROBILINOGEN UR QL STRIP.AUTO: 0.2 EU/DL (ref 0.2–1)
WBC # BLD AUTO: 11.1 K/UL (ref 3.6–11)
WBC URNS QL MICRO: NORMAL /HPF (ref 0–4)

## 2020-02-09 PROCEDURE — 80053 COMPREHEN METABOLIC PANEL: CPT

## 2020-02-09 PROCEDURE — 85027 COMPLETE CBC AUTOMATED: CPT

## 2020-02-09 PROCEDURE — 81001 URINALYSIS AUTO W/SCOPE: CPT

## 2020-02-09 PROCEDURE — 96360 HYDRATION IV INFUSION INIT: CPT

## 2020-02-09 PROCEDURE — 96374 THER/PROPH/DIAG INJ IV PUSH: CPT

## 2020-02-09 PROCEDURE — 74011250636 HC RX REV CODE- 250/636: Performed by: ADVANCED PRACTICE MIDWIFE

## 2020-02-09 PROCEDURE — 36415 COLL VENOUS BLD VENIPUNCTURE: CPT

## 2020-02-09 PROCEDURE — 87804 INFLUENZA ASSAY W/OPTIC: CPT

## 2020-02-09 PROCEDURE — 96361 HYDRATE IV INFUSION ADD-ON: CPT

## 2020-02-09 PROCEDURE — 99285 EMERGENCY DEPT VISIT HI MDM: CPT

## 2020-02-09 RX ORDER — ONDANSETRON 2 MG/ML
4 INJECTION INTRAMUSCULAR; INTRAVENOUS ONCE
Status: COMPLETED | OUTPATIENT
Start: 2020-02-09 | End: 2020-02-09

## 2020-02-09 RX ORDER — ONDANSETRON 2 MG/ML
INJECTION INTRAMUSCULAR; INTRAVENOUS
Status: DISCONTINUED
Start: 2020-02-09 | End: 2020-02-10 | Stop reason: HOSPADM

## 2020-02-09 RX ORDER — SODIUM CHLORIDE, SODIUM LACTATE, POTASSIUM CHLORIDE, CALCIUM CHLORIDE 600; 310; 30; 20 MG/100ML; MG/100ML; MG/100ML; MG/100ML
500 INJECTION, SOLUTION INTRAVENOUS CONTINUOUS
Status: DISCONTINUED | OUTPATIENT
Start: 2020-02-09 | End: 2020-02-10 | Stop reason: HOSPADM

## 2020-02-09 RX ORDER — SODIUM CHLORIDE, SODIUM LACTATE, POTASSIUM CHLORIDE, CALCIUM CHLORIDE 600; 310; 30; 20 MG/100ML; MG/100ML; MG/100ML; MG/100ML
500 INJECTION, SOLUTION INTRAVENOUS CONTINUOUS
Status: DISCONTINUED | OUTPATIENT
Start: 2020-02-09 | End: 2020-02-09

## 2020-02-09 RX ORDER — ONDANSETRON 4 MG/1
4 TABLET, ORALLY DISINTEGRATING ORAL
Qty: 15 TAB | Refills: 0 | Status: SHIPPED | OUTPATIENT
Start: 2020-02-09 | End: 2020-02-14

## 2020-02-09 RX ADMIN — SODIUM CHLORIDE, SODIUM LACTATE, POTASSIUM CHLORIDE, AND CALCIUM CHLORIDE 1000 ML: 600; 310; 30; 20 INJECTION, SOLUTION INTRAVENOUS at 20:24

## 2020-02-09 RX ADMIN — SODIUM CHLORIDE, SODIUM LACTATE, POTASSIUM CHLORIDE, AND CALCIUM CHLORIDE 500 ML: 600; 310; 30; 20 INJECTION, SOLUTION INTRAVENOUS at 22:31

## 2020-02-09 RX ADMIN — ONDANSETRON 4 MG: 2 INJECTION INTRAMUSCULAR; INTRAVENOUS at 20:47

## 2020-02-09 RX ADMIN — SODIUM CHLORIDE, SODIUM LACTATE, POTASSIUM CHLORIDE, AND CALCIUM CHLORIDE 1000 ML: 600; 310; 30; 20 INJECTION, SOLUTION INTRAVENOUS at 21:24

## 2020-02-10 NOTE — PROGRESS NOTES
Labor Progress Note    S: Patient seen, fetal heart rate and contraction pattern evaluated. Patient reports she feels much better and ready to go home. No episodes of vomiting/diarrhea while on L&D.      Physical Exam:  Patient Vitals for the past 4 hrs:   Temp Pulse Resp BP SpO2   20 2253 98.8 °F (37.1 °C) (!) 102  116/60    20 2146     99 %   20 2053     98 %   20 2004 98.9 °F (37.2 °C) (!) 128 18 127/78          Cervical Exam: deferred  Membranes:  Intact  Uterine Contractions:  Frequency: none  Fetal Heart Rate: Reactive, 150s, +accels, neg decels, moderate variability      Assessment/Plan:    37 y.o.  at 37w6d IUP  Diarrhea  Likely viral gastroenteritis  Cat 1 tracing  GBS negative    P:  D/c home with instructions  Rx sent for Zofran if needed for patient to   Reviewed comfort measures including tylenol & dosing  Encouraged small, frequent, bland meals, & hydration & electrolyte drinks/ broth  Reviewed labor parameters/ precautions, FKCs, warning s/s  Advised if unable to keep food/fluids down to return for IV hydration- can [facility] if prefers for IV hydration  F/u at regular prenatal visit, prn concerns, call prn  All questions asked/answered and agrees with plan    Rod Higginbotham CNM

## 2020-02-10 NOTE — H&P
OB ED Labor and Delivery Admission Note  2020    Patient is a 37 y.o. [id number] at 37w6d with adriane 20 who presents with c/o diarrhea at 2000. She reports she woke up this morning and was feeling some cramping and gas pain and around 1100 began having diarrhea, which she reports about an episode every hour x10 episodes. She reports some lower uterine cramping since, but denies any labor contractions. She does report some nausea, denies vomiting. Had some ravioli this morning and has been drinking ginger ale. She reports she checked her temperature at home and was 100.9 and 100.6, she hasn't taken anything for it, but was normal here. She reports good FM. Denies VB, LOF, changes in vision, RUQ/epigastric pain. Feels like she's starting to get a mild h/a, which she attributes to not eating/drinking much. She reports prenatal care with Dr. Keila Martinez c/b [facility] [place] with normal NIPT screening. Obesity. Anemia for which she is eating iron rich foods. Hx of son (P2) with tricuspid atresia, had normal echo this pregnancy and normal scan at [facility]. Hx of c/s with P2 and desires TOLAC. Also has a hx of anxiety/depression and reports she is not on any meds, but is seeing [facility] Sandrine Caruso at [facility] for counseling and plans treatment postpartum. P1  VAVD for NRFHTs. Girl. 6lbs 4oz. ? IOL for pre-eclampsia per patient. Denies other complications. P2  stat PCS for NRFHTs. Boy. 9lbs. Hypoplastic right heart syndrome (tricuspid atresia). Intraopcystoscopy for hematuria.   SAB x1       PNC: Blood type: A            RH: pos            Hep B: negative            Rubella: immune            GBS status: negative   HIV: non reactive   T pallidum antibodies: negative   GC/CT: negative      OBHX:   OB History        4    Para   2    Term   2            AB   1    Living   2       SAB   1    TAB        Ectopic        Molar        Multiple        Live Births   2                PMH:   Past Medical History:   Diagnosis Date    Abnormal Pap smear 2004    colposcopy done and they have been normal since    Abnormal Papanicolaou smear of cervix 2004    HPV, colpo, WNL after    ADHD     Anemia NEC     has been on iron in the past, but currently not taking extra iron    Essential hypertension     pih at 36 weeks with G1 pregnancy. IOL at 38 weeks    Migraines     Panic attacks     Postpartum depression     1st pregnancy - no meds    Psychiatric problem     anxiety/depression, no meds in pregnancy, seeing therapist, plans for meds PP       PSH:   Past Surgical History:   Procedure Laterality Date    HX  SECTION          HX OTHER SURGICAL      wisdom teeth ext.  HX OTHER SURGICAL      R leg, bone cyst removed       OB/GYN: [id number] at 37w6d with adriane 20. See above    Meds:   Prior to Admission Medications   Prescriptions Last Dose Informant Patient Reported? Taking? B.infantis-B.ani-B.long-B.bifi (PROBIOTIC 4X) 10-15 mg TbEC 2020 at Unknown time  Yes Yes   Sig: Take  by mouth. PNV#16-Iron Fum & PS-FA-OM-3 (CONCEPT DHA) 35-1-200 mg cap 2020 at Unknown time  Yes Yes   Sig: Take  by mouth.   magnesium 250 mg tab 2020 at Unknown time  Yes Yes   Sig: Take  by mouth.   prenatal multivit-ca-min-fe-fa Tab 2020 at Unknown time  Yes Yes   Sig: Take  by mouth daily. Indications: PREGNANCY      Facility-Administered Medications: None       Allergies:    Allergies   Allergen Reactions    Latex Rash    Morphine Shortness of Breath and Rash       Pertinent ROS: Review of Systems - Negative except noted in [facility]:   Family History   Problem Relation Age of Onset    Cancer Maternal Grandfather 79        pancreatic    Anxiety Father     Depression Father     Hypertension Father     Arthritis-osteo Father     High Cholesterol Maternal Grandmother     Arthritis-osteo Maternal Grandmother     Cancer Paternal Grandmother         lung    Heart Disease Paternal Grandfather     Cancer Maternal Aunt breast    Congenital heart defect Son        SH:   Social History     Socioeconomic History    Marital status:      Spouse name: Not on file    Number of children: Not on file    Years of education: Not on file    Highest education level: Not on file   Occupational History    Not on file   Social Needs    Financial resource strain: Not on file    Food insecurity:     Worry: Not on file     Inability: Not on file    Transportation needs:     Medical: Not on file     Non-medical: Not on file   Tobacco Use    Smoking status: Never Smoker    Smokeless tobacco: Never Used   Substance and Sexual Activity    Alcohol use: No    Drug use: Not Currently    Sexual activity: Yes     Partners: Male     Birth control/protection: None   Lifestyle    Physical activity:     Days per week: Not on file     Minutes per session: Not on file    Stress: Not on file   Relationships    Social connections:     Talks on phone: Not on file     Gets together: Not on file     Attends Spiritism service: Not on file     Active member of club or organization: Not on file     Attends meetings of clubs or organizations: Not on file     Relationship status: Not on file    Intimate partner violence:     Fear of current or ex partner: Not on file     Emotionally abused: Not on file     Physically abused: Not on file     Forced sexual activity: Not on file   Other Topics Concern     Service Not Asked    Blood Transfusions Not Asked    Caffeine Concern Not Asked    Occupational Exposure Not Asked   Hettie Model Hazards Not Asked    Sleep Concern Not Asked    Stress Concern Not Asked    Weight Concern Not Asked    Special Diet Not Asked    Back Care Not Asked    Exercise Not Asked    Bike Helmet Not Asked    Seat Belt Not Asked    Self-Exams Not Asked   Social History Narrative    Not on file       OBJECTIVE:    Temp (24hrs), Av.9 °F (37.2 °C), Min:98.9 °F (37.2 °C), Max:98.9 °F (37.2 °C)    Visit Vitals  /78   Pulse (!) 128   Temp 98.9 °F (37.2 °C)   Resp 18   Ht 5' 10\" (1.778 m)   Wt 119.3 kg (263 lb)   SpO2 98%   BMI 37.74 kg/m²       FHR baseline 175s, +accels, neg decels, moderate variability  After fluid bolus baseline decreased to 140s, +accels, neg decels, moderate variability  [place] irritability    Exam:  General: alert & oriented x3  HEENT:  normal   Lungs:  clear  Cor:  RRR  Abdomen:  Soft, non-tender to palpation, gravid, +FM noted on exam                    Clinical EFW [id number]  Cervix:  deferred  Fluid:  intact  Pelvimetry:  AP-good                      Arch- adequate                      Sidewalls- adequate                      Pelvis feels adequate for fetus.   Extremities:  normal, trace BLE edema       Impression: [id number] at 37w6d IUP  Nausea/diarrhea/dehydration  Flu v gastroenteritis   Cat 1 tracing s/p IV hydration  GBS negative  Hx LTCS, desires TOLAC  AMA- normal NIPT  Depression stable off meds  Obesity  Son with tricuspid atresia      Plan:  CBC, CMP, rapid influenza swab, urinalysis with reflex  IV bolus  Continuous monitoring  Reviewed prenatal records   Reviewed plan with patient and partner and agree with plan, all questions asked/answered  Reviewed patient with Dr. Maryann Caceres, agrees with plan     Reyes Baseman, CNM  9:41 PM

## 2020-02-10 NOTE — DISCHARGE INSTRUCTIONS
Am I in Labor? ?  While each woman may feel contractions differently, these facts may help you decide if you are in true labor. A contraction is a painful tightening of the uterus. It may feel like the baby is sitting up, a bad backache or severe menstrual cramps. Sometimes women have contractions that do not cause cervical change- this is often called \"false labor. \"    In TRUE LABOR contractions: In FALSE LABOR contractions:   * Occur regularly every 5 min or less * Occur irregularly   * Feel stronger and hurt more * Stay the same or space out   * Become stronger with walking * Strength stays the same or they hurt less  when walking   * Pinkish mucus or spotting maybe present          Call your provider if:  Regular, painful contractions every 5 minutes or less for one hour. Time your contractions   * You have a gush of fluid or blood from your vaginal (it is normal to have spotting after a vaginal exam or intercourse). * Your baby is not moving as much as usual- at least 4 fetal movements in 1 hour after drinking and resting on your side for 1 hour. Report one or more of the following: SWELLING (Edema)    Fever 101 or above orally   Avoid standing for long periods of time, keep your legs up when you can. Vaginal bleeding (bright red, like a period)  As tolerated   Uterine Contractions (4 to 6 in 1 hours time) Limit the amount of salty foods you eat   Suspected leakage from your bag of water Try to wear support hose   Decreased fetal movment                      SIGNS AND SYMPTOMS OF LABOR  * Uterine cramping * Low abdominal pressure (pelvic pressure)   * Uterine contractions every 10-15 minutes or more * Dull low backache (intermittent or constant)   * Abdominal cramping with or without diarrhea * Feeling like your baby is pushing down   * Increase or change in vaginal discharge      When these symptoms are experienced. ..  STOP what you are doing, lie on your side, drink two or three glasses of water or juice, and wait one hour. If the symptoms worsen call your care provider. If the symptoms go away inform your care provider at the next visit that this happened.

## 2020-02-10 NOTE — PROGRESS NOTES
2000: pt arrived from home with c/o 10 episodes of diarrhea since 1100 and nausea, no vomiting. Denies any obstetrical complaints but believes she may need IV fluids. Reports that her  and daughter both had the flu a little over a week ago and were treated. 2005: Costa Scanlon notified of pts arrival, FHR, and vital signs (MEWS score of 3) - coming to see patuient    2120: reports feeling better, still unable to void, will hand 2nd liter of fluid per Costa Scanlon    : I have reviewed discharge instructions with the patient. The patient verbalized understanding.

## 2020-02-26 ENCOUNTER — HOSPITAL ENCOUNTER (INPATIENT)
Age: 35
LOS: 3 days | Discharge: HOME | End: 2020-02-29
Attending: OBSTETRICS & GYNECOLOGY | Admitting: OBSTETRICS & GYNECOLOGY
Payer: COMMERCIAL

## 2020-02-26 LAB
ERYTHROCYTE [DISTWIDTH] IN BLOOD BY AUTOMATED COUNT: 13.5 % (ref 11.5–14.5)
HCT VFR BLD AUTO: 33.1 % (ref 35–47)
HGB BLD-MCNC: 10.8 G/DL (ref 11.5–16)
MCH RBC QN AUTO: 29.5 PG (ref 26–34)
MCHC RBC AUTO-ENTMCNC: 32.6 G/DL (ref 30–36.5)
MCV RBC AUTO: 90.4 FL (ref 80–99)
NRBC # BLD: 0 K/UL (ref 0–0.01)
NRBC BLD-RTO: 0 PER 100 WBC
PLATELET # BLD AUTO: 179 K/UL (ref 150–400)
PMV BLD AUTO: 11.3 FL (ref 8.9–12.9)
RBC # BLD AUTO: 3.66 M/UL (ref 3.8–5.2)
WBC # BLD AUTO: 9.1 K/UL (ref 3.6–11)

## 2020-02-26 PROCEDURE — 74011250637 HC RX REV CODE- 250/637: Performed by: OBSTETRICS & GYNECOLOGY

## 2020-02-26 PROCEDURE — 36415 COLL VENOUS BLD VENIPUNCTURE: CPT

## 2020-02-26 PROCEDURE — 75410000002 HC LABOR FEE PER 1 HR

## 2020-02-26 PROCEDURE — 3E033VJ INTRODUCTION OF OTHER HORMONE INTO PERIPHERAL VEIN, PERCUTANEOUS APPROACH: ICD-10-PCS | Performed by: OBSTETRICS & GYNECOLOGY

## 2020-02-26 PROCEDURE — 77030028565 HC CATH CERV RIPNG BLN COOK -B

## 2020-02-26 PROCEDURE — 65270000029 HC RM PRIVATE

## 2020-02-26 PROCEDURE — 85027 COMPLETE CBC AUTOMATED: CPT

## 2020-02-26 RX ORDER — ONDANSETRON 2 MG/ML
4 INJECTION INTRAMUSCULAR; INTRAVENOUS
Status: DISCONTINUED | OUTPATIENT
Start: 2020-02-26 | End: 2020-02-27 | Stop reason: HOSPADM

## 2020-02-26 RX ORDER — BUTORPHANOL TARTRATE 1 MG/ML
1 INJECTION INTRAMUSCULAR; INTRAVENOUS
Status: DISCONTINUED | OUTPATIENT
Start: 2020-02-26 | End: 2020-02-29 | Stop reason: HOSPADM

## 2020-02-26 RX ORDER — SODIUM CHLORIDE 0.9 % (FLUSH) 0.9 %
5-40 SYRINGE (ML) INJECTION EVERY 8 HOURS
Status: DISCONTINUED | OUTPATIENT
Start: 2020-02-26 | End: 2020-02-29 | Stop reason: HOSPADM

## 2020-02-26 RX ORDER — ACETAMINOPHEN 325 MG/1
650 TABLET ORAL
Status: DISCONTINUED | OUTPATIENT
Start: 2020-02-26 | End: 2020-02-29 | Stop reason: HOSPADM

## 2020-02-26 RX ORDER — OXYTOCIN/0.9 % SODIUM CHLORIDE 30/500 ML
0-25 PLASTIC BAG, INJECTION (ML) INTRAVENOUS
Status: DISCONTINUED | OUTPATIENT
Start: 2020-02-27 | End: 2020-02-27 | Stop reason: HOSPADM

## 2020-02-26 RX ORDER — DIPHENHYDRAMINE HCL 25 MG
25 CAPSULE ORAL
Status: DISCONTINUED | OUTPATIENT
Start: 2020-02-26 | End: 2020-02-29 | Stop reason: HOSPADM

## 2020-02-26 RX ORDER — SODIUM CHLORIDE, SODIUM LACTATE, POTASSIUM CHLORIDE, CALCIUM CHLORIDE 600; 310; 30; 20 MG/100ML; MG/100ML; MG/100ML; MG/100ML
125 INJECTION, SOLUTION INTRAVENOUS CONTINUOUS
Status: DISCONTINUED | OUTPATIENT
Start: 2020-02-26 | End: 2020-02-29 | Stop reason: HOSPADM

## 2020-02-26 RX ORDER — SODIUM CHLORIDE 0.9 % (FLUSH) 0.9 %
5-40 SYRINGE (ML) INJECTION AS NEEDED
Status: DISCONTINUED | OUTPATIENT
Start: 2020-02-26 | End: 2020-02-29 | Stop reason: HOSPADM

## 2020-02-26 RX ORDER — NALOXONE HYDROCHLORIDE 0.4 MG/ML
0.4 INJECTION, SOLUTION INTRAMUSCULAR; INTRAVENOUS; SUBCUTANEOUS AS NEEDED
Status: DISCONTINUED | OUTPATIENT
Start: 2020-02-26 | End: 2020-02-27 | Stop reason: HOSPADM

## 2020-02-26 RX ADMIN — Medication 10 ML: at 20:52

## 2020-02-26 RX ADMIN — ACETAMINOPHEN 650 MG: 325 TABLET ORAL at 20:49

## 2020-02-26 RX ADMIN — DIPHENHYDRAMINE HYDROCHLORIDE 25 MG: 25 CAPSULE ORAL at 20:50

## 2020-02-26 NOTE — PROGRESS NOTES
2/26/2020  4:17 PM Patient arrived to LDR 3 for scheduled IOL. Patient reports positive fetal movement and denies any vaginal bleeding or fluid. 5 Dr. Alicia Blancas notified of patient's arrival. Will be over shortly to see patient. 1815 Dr. Alicia Blancas at bedside to see patient and discuss plan of care. SVE, 3/50/-3. [place] bulb placed without difficulty, 60 cc uterine/ 60 cc vaginal. Bedside US performed, vertex position confirmed. Plan to start pitocin at 4am.     1940 Bedside and Verbal shift change report given to Yann Oconnor, RN (oncoming nurse) by Eleazar Florez RN and ISABEL Brown RN (offgoing nurse). Report included the following information SBAR, Kardex, MAR, Accordion, Recent Results and Med Rec Status.

## 2020-02-26 NOTE — H&P
History & Physical    Name: Prosper Zamora MRN: [id number]  SSN: [id number]    YOB: 1985  Age: 37 y.o. Sex: female      Subjective:     Estimated Date of Delivery: 20  OB History    Para Term  AB Living   4 2 2   1 2   SAB TAB Ectopic Molar Multiple Live Births   1         2      # Outcome Date GA Lbr Frank/2nd Weight Sex Delivery Anes PTL Lv   4 Current            3 Term 2016    M CS-LTranv  N    2 Term 13 38w4d 08:32 / 03:29 2.845 kg F VAVD EPIDURAL AN N PABLO   1 SAB                Ms. Alexsandra Catherine is a [id number] admitted with pregnancy at 40w2d for induction of labor due to post due date. 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 A fetal echo was performed with this pregnancy and was normal.  Prenatal course was complicated by AMA (normal NIPT) and obesity. She also has a h/o anxiety and depression and is not on meds however follows with psychologist (Armond) - she has no depressed sxs today. Please see prenatal records for details. P1  VAVD for NRFHTs. Girl. 6lbs 4oz. ? IOL for pre-eclampsia per patient. Denies other complications. P2 2016 stat PCS for NRFHTs. Boy. 9lbs. Hypoplastic right heart syndrome (tricuspid atresia). Intraopcystoscopy for hematuria. SAB x1    Past Medical History:   Diagnosis Date    Abnormal Pap smear     colposcopy done and they have been normal since    Abnormal Papanicolaou smear of cervix     HPV, colpo, WNL after    ADHD     Anemia NEC     has been on iron in the past, but currently not taking extra iron    Essential hypertension     pih at 36 weeks with G1 pregnancy.  IOL at 38 weeks    Migraines     Panic attacks     Postpartum depression     1st pregnancy - no meds    Psychiatric problem     anxiety/depression, no meds in pregnancy, seeing therapist, plans for meds PP     Past Surgical History:   Procedure Laterality Date  HX  SECTION      2016    HX OTHER SURGICAL  2001    wisdom teeth ext.  HX OTHER SURGICAL      R leg, bone cyst removed     Social History     Occupational History    Not on file   Tobacco Use    Smoking status: Never Smoker    Smokeless tobacco: Never Used   Substance and Sexual Activity    Alcohol use: No    Drug use: Not Currently    Sexual activity: Yes     Partners: Male     Birth control/protection: None     Family History   Problem Relation Age of Onset    Cancer Maternal Grandfather 79        pancreatic    Anxiety Father     Depression Father     Hypertension Father     Arthritis-osteo Father     High Cholesterol Maternal Grandmother     Arthritis-osteo Maternal Grandmother     Cancer Paternal Grandmother         lung    Heart Disease Paternal Grandfather     Cancer Maternal Aunt         breast    Congenital heart defect Son        Allergies   Allergen Reactions    Latex Rash    Morphine Shortness of Breath and Rash     Prior to Admission medications    Medication Sig Start Date End Date Taking? Authorizing Provider   magnesium 250 mg tab Take  by mouth. Yes Provider, Historical   PNV#16-Iron Fum & PS-FA-OM-3 (CONCEPT DHA) 35-1-200 mg cap Take  by mouth. Yes Provider, Historical   prenatal multivit-ca-min-fe-fa Tab Take  by mouth daily. Indications: PREGNANCY   Yes Provider, Historical   B.infantis-B.ani-B.long-B.bifi (PROBIOTIC 4X) 10-15 mg TbEC Take  by mouth.    Yes Provider, Historical        Review of Systems   As noted in HPI otherwise negative    Objective:     Vitals:  Vitals:    20 1622 20 1624   BP: 124/85    Pulse: (!) 108    Resp: 15    Weight:  122.5 kg (270 lb)   Height:  5' 10\" (1.778 m)        Physical Exam    Gen: NAD  Resp: non-labored  Abd: soft, nontender, gravid  Cervical Exam: 2RR/66%/-0, intact, cephalic, baby is ballotable  Fetal Heart Rate: Reactive - Baseline 145, moderate variability, + accels, no decels  EFW 8-9#    Bedside ultrasound confirms cephalic presentation    Prenatal Labs:    Lab Results   Component Value Date/Time    Rubella, External Immune 01/24/2013    GrBStrep, External Negative 01/31/2020    HBsAg, External negative 07/01/2019    HIV, External nonreactive 07/01/2019    RPR, External non-reactive 05/16/2013    Gonorrhea, External negative 07/01/2019    Chlamydia, External negative 07/01/2019    ABO,Rh A Positive 07/01/2019          Impression/Plan:     Active Problems:    Pregnancy (1/24/2020)     [id number] at 36 2/7 here for IOL/TOLAC for post due dates    Plan: Admit for induction of labor. Group B Strep negative. Reviewed risks of TOLAC and pt accepts risks and desires to proceed. We discussed that IOL may increase risk of uterine rupture however as long as appropriate methods of induction used does not increase this risk to an unreasonable level. As reviewed with her LATOYA, Remedios Guzman, plan to place Dellar Ros catheter per vagina for cervical ripening and then plan to start pitocin at 0400. She will be monitored continuously due to [facility]. Pt agrees with plan and is consented x 2.     Judy Padilla MD

## 2020-02-27 PROCEDURE — 75410000000 HC DELIVERY VAGINAL/SINGLE

## 2020-02-27 PROCEDURE — 77030031139 HC SUT VCRL2 J&J -A

## 2020-02-27 PROCEDURE — 74011250636 HC RX REV CODE- 250/636: Performed by: OBSTETRICS & GYNECOLOGY

## 2020-02-27 PROCEDURE — 74011000250 HC RX REV CODE- 250

## 2020-02-27 PROCEDURE — 0HQ9XZZ REPAIR PERINEUM SKIN, EXTERNAL APPROACH: ICD-10-PCS | Performed by: OBSTETRICS & GYNECOLOGY

## 2020-02-27 PROCEDURE — 65410000002 HC RM PRIVATE OB

## 2020-02-27 PROCEDURE — 74011250637 HC RX REV CODE- 250/637: Performed by: OBSTETRICS & GYNECOLOGY

## 2020-02-27 PROCEDURE — 75410000003 HC RECOV DEL/VAG/CSECN EA 0.5 HR

## 2020-02-27 PROCEDURE — 75410000002 HC LABOR FEE PER 1 HR

## 2020-02-27 RX ORDER — SODIUM CHLORIDE 0.9 % (FLUSH) 0.9 %
5-40 SYRINGE (ML) INJECTION AS NEEDED
Status: DISCONTINUED | OUTPATIENT
Start: 2020-02-27 | End: 2020-02-29 | Stop reason: HOSPADM

## 2020-02-27 RX ORDER — OXYCODONE AND ACETAMINOPHEN 5; 325 MG/1; MG/1
1 TABLET ORAL
Status: DISCONTINUED | OUTPATIENT
Start: 2020-02-27 | End: 2020-02-29 | Stop reason: HOSPADM

## 2020-02-27 RX ORDER — HYDROCORTISONE ACETATE PRAMOXINE HCL 2.5; 1 G/100G; G/100G
CREAM TOPICAL AS NEEDED
Status: DISCONTINUED | OUTPATIENT
Start: 2020-02-27 | End: 2020-02-29 | Stop reason: HOSPADM

## 2020-02-27 RX ORDER — SIMETHICONE 80 MG
80 TABLET,CHEWABLE ORAL
Status: DISCONTINUED | OUTPATIENT
Start: 2020-02-27 | End: 2020-02-29 | Stop reason: HOSPADM

## 2020-02-27 RX ORDER — ACETAMINOPHEN 325 MG/1
650 TABLET ORAL
Status: DISCONTINUED | OUTPATIENT
Start: 2020-02-27 | End: 2020-02-29 | Stop reason: HOSPADM

## 2020-02-27 RX ORDER — LIDOCAINE HYDROCHLORIDE 10 MG/ML
INJECTION INFILTRATION; PERINEURAL
Status: COMPLETED
Start: 2020-02-27 | End: 2020-02-27

## 2020-02-27 RX ORDER — IBUPROFEN 400 MG/1
800 TABLET ORAL EVERY 8 HOURS
Status: DISCONTINUED | OUTPATIENT
Start: 2020-02-27 | End: 2020-02-29 | Stop reason: HOSPADM

## 2020-02-27 RX ORDER — AMMONIA 15 % (W/V)
1 AMPUL (EA) INHALATION AS NEEDED
Status: DISCONTINUED | OUTPATIENT
Start: 2020-02-27 | End: 2020-02-29 | Stop reason: HOSPADM

## 2020-02-27 RX ORDER — DIPHENHYDRAMINE HCL 25 MG
25 CAPSULE ORAL
Status: DISCONTINUED | OUTPATIENT
Start: 2020-02-27 | End: 2020-02-29 | Stop reason: HOSPADM

## 2020-02-27 RX ORDER — OXYTOCIN/RINGER'S LACTATE 20/1000 ML
125 PLASTIC BAG, INJECTION (ML) INTRAVENOUS AS NEEDED
Status: DISCONTINUED | OUTPATIENT
Start: 2020-02-27 | End: 2020-02-29 | Stop reason: HOSPADM

## 2020-02-27 RX ORDER — HYDROCORTISONE 1 %
CREAM (GRAM) TOPICAL AS NEEDED
Status: DISCONTINUED | OUTPATIENT
Start: 2020-02-27 | End: 2020-02-29 | Stop reason: HOSPADM

## 2020-02-27 RX ORDER — DOCUSATE SODIUM 100 MG/1
100 CAPSULE, LIQUID FILLED ORAL
Status: DISCONTINUED | OUTPATIENT
Start: 2020-02-27 | End: 2020-02-29 | Stop reason: HOSPADM

## 2020-02-27 RX ORDER — OXYTOCIN/RINGER'S LACTATE 20/1000 ML
999 PLASTIC BAG, INJECTION (ML) INTRAVENOUS AS NEEDED
Status: DISCONTINUED | OUTPATIENT
Start: 2020-02-27 | End: 2020-02-29 | Stop reason: HOSPADM

## 2020-02-27 RX ORDER — SODIUM CHLORIDE 0.9 % (FLUSH) 0.9 %
5-40 SYRINGE (ML) INJECTION EVERY 8 HOURS
Status: DISCONTINUED | OUTPATIENT
Start: 2020-02-27 | End: 2020-02-29 | Stop reason: HOSPADM

## 2020-02-27 RX ADMIN — IBUPROFEN 800 MG: 400 TABLET, FILM COATED ORAL at 16:56

## 2020-02-27 RX ADMIN — ACETAMINOPHEN 650 MG: 325 TABLET ORAL at 21:18

## 2020-02-27 RX ADMIN — SODIUM CHLORIDE, SODIUM LACTATE, POTASSIUM CHLORIDE, AND CALCIUM CHLORIDE 125 ML/HR: 600; 310; 30; 20 INJECTION, SOLUTION INTRAVENOUS at 12:24

## 2020-02-27 RX ADMIN — SODIUM CHLORIDE, SODIUM LACTATE, POTASSIUM CHLORIDE, AND CALCIUM CHLORIDE 125 ML/HR: 600; 310; 30; 20 INJECTION, SOLUTION INTRAVENOUS at 04:27

## 2020-02-27 RX ADMIN — LIDOCAINE HYDROCHLORIDE 10 ML: 10 INJECTION, SOLUTION INFILTRATION; PERINEURAL at 16:25

## 2020-02-27 RX ADMIN — OXYTOCIN-SODIUM CHLORIDE 0.9% IV SOLN 30 UNIT/500ML 1 MILLI-UNITS/MIN: 30-0.9/5 SOLUTION at 04:27

## 2020-02-27 NOTE — PROGRESS NOTES
Breathing through contractions but still doing well overall. Desires AROM.     Cat I NST  Contractions every 2-3min on pitocin 24mu    Cervix 6/60/-3, AROM with clear fluid and continued RFS  Will continue active management

## 2020-02-27 NOTE — PROGRESS NOTES
0734 Bedside and Verbal shift change report given to Ced Kearney RN and ISABEL Garcia RN (oncoming nurse) by Erika Cao RN (offgoing nurse). Report included the following information SBAR, Kardex, MAR, Accordion, Recent Results and Med Rec Status. 6364 Dr. Jannet Silva at bedside to discuss plan of care. SVE, /-3. Plan to reassess in 2-3 hours. AROM discussed, deferred at this time. 65 Dr. Jannet Silva at bedside to reassess patient. SVE, /-3. AROM, patient tolerated well. [phone] Dr. Jannet Silva called to bedside to perform SVE, -3. AROM, moderate amount of clear fluid noted. [phone] Dr. Jannet Silva at bedside to perform SVE, /-2.     1557 Dr. Jannet Silva at bedside to assess patient and plan of care. SVE, .     1605 SVE, 10/100. Patient begins pushing. Dr. Jannet Silva continues to be at bedside. 1615 Successful  to LFI. Infant placed skin to skin with mom. [address] patient to bathroom, unable to void. 190 TRANSFER - OUT REPORT:    Verbal report given to LATASHA Horn RN (name) on [facility]  being transferred to [facility] (unit) for routine progression of care       Report consisted of patients Situation, Background, Assessment and   Recommendations(SBAR). Information from the following report(s) SBAR, Kardex, MAR, Accordion, Recent Results and Med Rec Status was reviewed with the receiving nurse. Lines:   Peripheral IV 20 Left;Posterior Hand (Active)   Site Assessment Clean, dry, & intact 2020  7:42 AM   Phlebitis Assessment 0 2020  7:42 AM   Infiltration Assessment 0 2020  7:42 AM   Dressing Status Clean, dry, & intact 2020  7:42 AM   Dressing Type Transparent;Tape 2020  7:42 AM   Hub Color/Line Status Pink; Infusing 2020  7:42 AM   Action Taken Open ports on tubing capped 2020  8:00 PM   Alcohol Cap Used Yes 2020  8:00 PM        Opportunity for questions and clarification was provided.       Patient transported with:   Registered Nurse

## 2020-02-27 NOTE — PROGRESS NOTES
S: Pt seen, examined and chart reviewed. Undergoing post-dates IOL/TOLAC. S/p balloon now on pitocin, increasingly uncomfortable. O:   Visit Vitals  /80 (BP 1 Location: Left arm)   Pulse 86   Temp 98.4 °F (36.9 °C)   Resp 14   Ht 5' 10\" (1.778 m)   Wt 122.5 kg (270 lb)   Breastfeeding No   BMI 38.74 kg/m²     Patient Vitals for the past 4 hrs: Mode Fetal Heart Rate Fetal Activity Variability Decelerations Accelerations RN Reviewed Strip?   20 0915 External 155     Yes   20 0900 External 130 Present 6-25 BPM None Yes Yes   20 0845 External 140     Yes   20 0830 External 145 Present 6-25 BPM None Yes Yes   20 0815 External 165     Yes   20 0800 External 130 Present 6-25 BPM None Yes Yes   20 0745 External 140     Yes   20 0730 External 130 Present 6-25 BPM None Yes Yes   20 0715 External 130     Yes   20 0700 External 120 Present 6-25 BPM None Yes Yes   20 0645 US Readjusted; External 120     Yes   20 0630 External 120 Present 6-25 BPM None Yes Yes   20 0615 External 135     Yes   20 0600 External 125 Present 6-25 BPM None Yes Yes   20 0550 External 130     Yes     [place]: every 2-3min on pitocin 10mu    Gen - NAD  Resp - nl effort  Abd - gravid, non-tender, EFW 8-9#  Cervix - 5/60/-3    A/P: [id number] at 40w3d undergoing post-dates IOL, h/o C/S x 1 but also prior  - tested to 6#4, this baby is clinically closer to 9#, GBS neg, on pitocin now s/p balloon overnight.     - pt understands risks/benefits of TOLAC in setting of LGA baby  - continue with pitocin titration to adequate pattern  - plan AROM when baby is well applied to cervix (vertrex confirmed on admit)  - cont active management

## 2020-02-27 NOTE — L&D DELIVERY NOTE
Delivery Summary  Patient: Leida Bucio             Circumcision:   NA-female  Additional Delivery Comments - Pt pushed x 3 contractions with delivery of infant's head in KIM position, no nuchal cord, easy shoulder and delivery remainder of infant's body. Baby to mother's abdomen and bulb suctioned. Cord clamping delayed x 2 min. Spontaneous placenta after 5 mins. Midline 1st degree laceration repaired with 3-0 vicryl.   Fundus firm and hemostasis excellent.;    Information for the patient's :  Marlon Crane [[id number]]     Delivery Type: Vaginal, Spontaneous   Delivery Date: 2020   Delivery Time: 4:15 PM     Birth Weight:       Sex:  female  Delivery Clinician:  Zahira Major   Gestational Age: 41w4d    Presentation: Vertex   Position: Right  Occiput  Anterior     Apgars were 9  and 9      Resuscitation Method: Tactile Stimulation;Suctioning-bulb     Meconium Stained: None    Living Status:         Placenta Date/Time: 2020  4:21 PM   Placenta Removal: Spontaneous   Placenta Appearance: Normal    Cord Information: 3 Vessels    Cord Events: None       Disposition of Cord Blood:      Blood Gases Sent?:          Cord pH:  none    Episiotomy:     Laceration(s): 1st     Estimated Blood Loss (ml): clinically 300ml    Labor Events  Method:      Augmentation:     Cervical Ripenin2020  6:20 PM  [place]        Operative Vaginal Delivery - none

## 2020-02-27 NOTE — PROGRESS NOTES
1930: Bedside and Verbal shift change report given to MAL Burdick RN (oncoming nurse) by TERRI Roblero RN/RO Pérez RN (offgoing nurse). Report included the following information SBAR, Intake/Output, MAR and Recent Results. 2030: Pt c/o mild headache, requesting tylenol for pain and benadryl for sleep aid. 2100: Pt ready for bed, states she is feeling cramping and vaginal pressure from the morelos bulb but she is coping well.     0400: RN at bedside to remove morelos balloon and start pitocin, pt very hesitant about plan of care and not sure if she is comfortable with starting pitocin. RN offered to have MD come to the bedside to answer any pt questions. Pt denied offer, requesting a moment to discuss POC with her  before moving forward. 1124: Morelos balloon removed and discarded by RN. Pt states she is ready to move forward with pitocin induction. 4021: Pitocin started at 1 mu/ml. 2374: Bedside and Verbal shift change report given to MM. GEMMA Roblero/RO Pérez RN (oncoming nurse) by MAL Burdick RN (offgoing nurse). Report included the following information SBAR, Intake/Output, MAR and Recent Results.

## 2020-02-28 PROCEDURE — 74011250637 HC RX REV CODE- 250/637: Performed by: OBSTETRICS & GYNECOLOGY

## 2020-02-28 PROCEDURE — 65410000002 HC RM PRIVATE OB

## 2020-02-28 RX ORDER — DOCUSATE SODIUM 100 MG/1
100 CAPSULE, LIQUID FILLED ORAL
Qty: 60 CAP | Refills: 0 | Status: SHIPPED | OUTPATIENT
Start: 2020-02-28 | End: 2020-05-28

## 2020-02-28 RX ORDER — IBUPROFEN 800 MG/1
800 TABLET ORAL EVERY 8 HOURS
Qty: 60 TAB | Refills: 0 | Status: SHIPPED | OUTPATIENT
Start: 2020-02-28

## 2020-02-28 RX ADMIN — ACETAMINOPHEN 650 MG: 325 TABLET ORAL at 05:26

## 2020-02-28 RX ADMIN — ACETAMINOPHEN 650 MG: 325 TABLET ORAL at 16:53

## 2020-02-28 RX ADMIN — IBUPROFEN 800 MG: 400 TABLET, FILM COATED ORAL at 09:43

## 2020-02-28 RX ADMIN — ACETAMINOPHEN 650 MG: 325 TABLET ORAL at 11:50

## 2020-02-28 RX ADMIN — IBUPROFEN 800 MG: 400 TABLET, FILM COATED ORAL at 17:26

## 2020-02-28 RX ADMIN — IBUPROFEN 800 MG: 400 TABLET, FILM COATED ORAL at 01:31

## 2020-02-28 RX ADMIN — ACETAMINOPHEN 650 MG: 325 TABLET ORAL at 20:59

## 2020-02-28 NOTE — PROGRESS NOTES
Post-Partum Day Number 1 Progress Note    Samule Books     Assessment: Doing well, post partum day 1 s/p     Plan:  1. Continue routine postpartum and perineal care as well as maternal education. 2. Tailbone pain. Will get PT consult. Information for the patient's :  Marie Pichardo [[id number]]   [address], Spontaneous   Patient doing well without significant complaint. Voiding without difficulty, normal lochia. Vitals:  Visit Vitals  /76 (BP 1 Location: Right arm, BP Patient Position: At rest)   Pulse 85   Temp 97.9 °F (36.6 °C)   Resp 14   Ht 5' 10\" (1.778 m)   Wt 122.5 kg (270 lb)   Breastfeeding Unknown   BMI 38.74 kg/m²     Temp (24hrs), Av.2 °F (36.8 °C), Min:97.6 °F (36.4 °C), Max:98.5 °F (36.9 °C)        Exam:   Patient without distress. Abdomen soft, fundus firm, nontender                Perineum with normal lochia noted. Lower extremities are negative for swelling, cords or tenderness. Labs:     Lab Results   Component Value Date/Time    WBC 9.1 2020 05:40 PM    WBC 11.1 (H) 2020 08:29 PM    WBC 6.1 2018 07:37 PM    WBC 8.8 2015 04:30 AM    WBC 9.9 2013 01:59 PM    WBC 7.8 2013 05:01 PM    HGB 10.8 (L) 2020 05:40 PM    HGB 10.6 (L) 2020 08:29 PM    HGB 11.7 2018 07:37 PM    HGB 12.6 2015 04:30 AM    HGB 11.6 2013 01:59 PM    HGB 10.9 (L) 2013 05:01 PM    HCT 33.1 (L) 2020 05:40 PM    HCT 32.8 (L) 2020 08:29 PM    HCT 36.0 2018 07:37 PM    HCT 37.7 2015 04:30 AM    HCT 33.7 (L) 2013 01:59 PM    HCT 32.2 (L) 2013 05:01 PM    PLATELET 442 8409 05:40 PM    PLATELET 793  08:29 PM    PLATELET 888  07:37 PM    PLATELET 363  04:30 AM    PLATELET 606 15/47/2287 01:59 PM    PLATELET 085  05:01 PM       No results found for this or any previous visit (from the past 24 hour(s)).

## 2020-02-28 NOTE — PROGRESS NOTES
Bedside shift change report given to Tarun Goldstein RN (oncoming nurse) by AAYUSH Capone RN (offgoing nurse). Report included the following information SBAR.     1630  Called PT to notify them of consult. Directed to page @ [phone]. 1900  Patient states her tailbone is feeling better with position changes. She would like for PT consult to be cancelled. Will discuss with oncoming RN.     8:09 PM  I have reviewed and agree with Khris Richter RN documentation.

## 2020-02-28 NOTE — PROGRESS NOTES
Bedside shift change report given to AAYUSH Raymonds RN (oncoming nurse) by PHOENIX Boothe (offgoing nurse). Report included the following information SBAR.

## 2020-02-28 NOTE — PROGRESS NOTES
Shift times [id number]    MMWrotemalik RN (Orienting Nurse) precepting Shaquille RN (Orientee). I was present for and agree with assessment and documentation.

## 2020-02-28 NOTE — LACTATION NOTE
This note was copied from a baby's chart. Initial Lactation Consultation: Infant born vaginally this afternoon to a  mom at 36 2/7 weeks gestation. Mom nursed her other 2 children with adequate milk supply. Mom has a negative history impacting lactation. She states infant has been latching well since birth. We discussed normal infant feeding patterns and the importance of a deep latch. Mom expresses confidence and will call for assistance as needed. Feeding Plan: Mother will keep baby skin to skin as often as possible, feed on demand, 8-12x/day , respond to feeding cues, obtain latch, listen for audible swallowing, be aware of signs of oxytocin release/ cramping,thirst,sleepiness while breastfeeding, offer both breasts,and will not limit feedings. Mother agrees to utilize breast massage while nursing to facilitate lactogenesis.

## 2020-02-28 NOTE — ROUTINE PROCESS
1900: TRANSFER - IN REPORT: 
 
Verbal report received from RO Pérez RN and Jocelyne Cruz RN (name) on Debbie Cedillo  being received from L&D (unit) for routine progression of care Report consisted of patients Situation, Background, Assessment and  
Recommendations(SBAR). Information from the following report(s) SBAR, Intake/Output and MAR was reviewed with the receiving nurse. Opportunity for questions and clarification was provided. Assessment completed upon patients arrival to unit and care assumed. Report from RN stated patients fundus was deviated and she had not voided since before delivery. Assisted up to restroom and patient voided. Fundus firm at U post void with small bleeding. Check void by 0100.

## 2020-02-28 NOTE — DISCHARGE SUMMARY
Obstetrical Discharge Summary     Name: Jalen Rehman MRN: [id number]  SSN: [id number]    YOB: 1985  Age: 37 y.o. Sex: female      Admit Date: 2020    Discharge Date: 2020     Admitting Physician: Marcie Butt MD     Attending Physician:  Vonda ACUÑA,*     Admission Diagnoses: Pregnancy [Z34.90]    Discharge Diagnoses:   Information for the patient's :  Shania Cao [[id number]]   Delivery of a 3.97 kg female infant via [address], Spontaneous on 2020 at 4:15 PM  by Michael Spence. Apgars were 9  and 9 . Additional Diagnoses:   Hospital Problems  Date Reviewed: 2019          Codes Class Noted POA    Pregnancy ICD-10-CM: Z34.90  ICD-9-CM: V22.2  2020 Unknown             Lab Results   Component Value Date/Time    Rubella, External Immune 2013    GrBStrep, External Negative 2020       Hospital Course: Normal hospital course following the delivery. Patient Instructions:   Current Discharge Medication List      START taking these medications    Details   docusate sodium (COLACE) 100 mg capsule Take 1 Cap by mouth daily as needed for Constipation for up to 90 days. Qty: 60 Cap, Refills: 0      ibuprofen (MOTRIN) 800 mg tablet Take 1 Tab by mouth every eight (8) hours. Qty: 60 Tab, Refills: 0         CONTINUE these medications which have NOT CHANGED    Details   magnesium 250 mg tab Take  by mouth. PNV#16-Iron Fum & PS-FA-OM-3 (CONCEPT DHA) 35-1-200 mg cap Take  by mouth.      prenatal multivit-ca-min-fe-fa Tab Take  by mouth daily. Indications: PREGNANCY      B.infantis-B.ani-B.long-B.bifi (PROBIOTIC 4X) 10-15 mg TbEC Take  by mouth. Disposition at Discharge: Home or self care    Condition at Discharge: Stable    Reference my discharge instructions.     Follow-up Appointments   Procedures    FOLLOW UP VISIT Appointment in: 6 Weeks     Standing Status:   Standing     Number of Occurrences:   1     Order Specific Question:   Appointment in     Answer:   6 Weeks        Signed By:  Viral Jarrett MD     February 28, 2020

## 2020-02-28 NOTE — LACTATION NOTE
This note was copied from a baby's chart. Per mom, infant nursing well. Declines visit at this time, stating she will call for assistance as needed.

## 2020-02-29 VITALS
BODY MASS INDEX: 38.65 KG/M2 | WEIGHT: 270 LBS | HEART RATE: 76 BPM | DIASTOLIC BLOOD PRESSURE: 78 MMHG | HEIGHT: 70 IN | SYSTOLIC BLOOD PRESSURE: 124 MMHG | RESPIRATION RATE: 16 BRPM | TEMPERATURE: 98.4 F

## 2020-02-29 PROCEDURE — 74011250637 HC RX REV CODE- 250/637: Performed by: OBSTETRICS & GYNECOLOGY

## 2020-02-29 RX ADMIN — ACETAMINOPHEN 650 MG: 325 TABLET ORAL at 11:47

## 2020-02-29 RX ADMIN — ACETAMINOPHEN 650 MG: 325 TABLET ORAL at 06:22

## 2020-02-29 RX ADMIN — IBUPROFEN 800 MG: 400 TABLET, FILM COATED ORAL at 04:06

## 2020-02-29 RX ADMIN — IBUPROFEN 800 MG: 400 TABLET, FILM COATED ORAL at 12:01

## 2020-02-29 NOTE — PROGRESS NOTES
Post-Partum Day Number 2 Progress Note    Julian Honeycutt     Assessment: Doing well, post partum day 2    Plan:   1. Discharge home today  2. Follow up in office in 6 weeks with Juan Quarles, Jessica Girard,*  3. Post partum activity advised, diet as tolerated  4. Discharge Medications: ibuprofen, percocet and medications prior to admission    Information for the patient's :  Steven Maldonado [[id number]]   , Spontaneous   Patient doing well without significant complaint. Voiding without difficulty, normal lochia. Vitals:  Visit Vitals  /69 (BP 1 Location: Left arm, BP Patient Position: At rest)   Pulse 66   Temp 98.1 °F (36.7 °C)   Resp 16   Ht 5' 10\" (1.778 m)   Wt 122.5 kg (270 lb)   Breastfeeding Unknown   BMI 38.74 kg/m²     Temp (24hrs), Av.1 °F (36.7 °C), Min:97.9 °F (36.6 °C), Max:98.3 °F (36.8 °C)      Exam:         Patient without distress. Abdomen soft, fundus firm, nontender                 Lower extremities are negative for swelling, cords or tenderness.     Labs:     Lab Results   Component Value Date/Time    WBC 9.1 2020 05:40 PM    WBC 11.1 (H) 2020 08:29 PM    WBC 6.1 2018 07:37 PM    WBC 8.8 2015 04:30 AM    WBC 9.9 2013 01:59 PM    WBC 7.8 2013 05:01 PM    HGB 10.8 (L) 2020 05:40 PM    HGB 10.6 (L) 2020 08:29 PM    HGB 11.7 2018 07:37 PM    HGB 12.6 2015 04:30 AM    HGB 11.6 2013 01:59 PM    HGB 10.9 (L) 2013 05:01 PM    HCT 33.1 (L) 2020 05:40 PM    HCT 32.8 (L) 2020 08:29 PM    HCT 36.0 2018 07:37 PM    HCT 37.7 2015 04:30 AM    HCT 33.7 (L) 2013 01:59 PM    HCT 32.2 (L) 2013 05:01 PM    PLATELET 166  05:40 PM    PLATELET 615  08:29 PM    PLATELET 375  07:37 PM    PLATELET 220  04:30 AM    PLATELET 638  01:59 PM    PLATELET 013  05:01 PM       No results found for this or any previous visit (from the past 24 hour(s)).

## 2020-02-29 NOTE — DISCHARGE INSTRUCTIONS
Patient Education        Learning About Starting to Breastfeed  Planning ahead    Before your baby is born, plan ahead. Learn all you can about breastfeeding. This helps make breastfeeding easier. · Early in your pregnancy, talk to your doctor or midwife about breastfeeding. · Learn the basics before your baby is born. The staff at hospitals and birthing centers can help you find a lactation specialist. This person is often a nurse who has been trained to teach and advise women about breastfeeding. Or you can take a breastfeeding class. · Plan ahead for times when you will need help after your baby is born. Many women get help from friends and family. Some join a support group to talk to other moms who breastfeed. · Buy the equipment you'll need. Examples are breast pads, nipple cream, extra pillows, and nursing bras. Find out about breast pumps too. Getting help from your hospital or birthing center  It's important to have support from the doctors, nurses, and hospital staff who care for you and your baby. Before it's time for you to give birth, ask about the breastfeeding policies at your hospital or birthing center. Look for a hospital or birthing center that has policies for:  · \"Rooming in. \" This policy encourages you to have your baby in the room with you. It can allow you to breastfeed more often. · Supplemental feedings. Tell the staff that your baby is to get only your breast milk from birth. If staff feed your baby water, sugar solution, or formula right after birth without a medical reason, it may make it harder for you to breastfeed. · Pacifiers or artificial nipples. Staff should not give your  these items. They may interfere with breastfeeding. · Follow-up. Find out if your hospital can help you with breastfeeding issues after you go home. See if you can get information on support groups or other contacts.  They might help if you need help setting up and staying with your breastfeeding routine. Your first feeding  It's best to start breastfeeding within 1 hour of birth. For each feeding, you go through these basic steps:  · Get ready for the feeding. Be calm and relaxed, and try not to be distracted. Get some water or juice for yourself. Use two or three pillows to help support your baby while he or she is nursing. · Find a breastfeeding position that is comfortable for you and your baby. Examples are the cradle and the football positions. Make sure the baby's head and chest are lined up straight and facing your breast. It's best to switch which breast you start with each time. · Get the baby latched on well. Your baby's mouth needs to be wide open, like a yawn, so you may need to gently touch the middle of your baby's lower lip. When your baby's mouth is open wide, quickly bring the baby onto your nipple and areola. The areola is the dark [place] around your nipple. · Provide a complete feeding. Let your baby decide how long to nurse. Be sure to burp your baby after each breast.  In the first days after birth, your breasts make a thick, yellow liquid called colostrum. This liquid gives your baby nutrients and antibodies against infection. It is all that babies need at first. Your breasts will fill with milk a few days after the birth. Talk to your doctor, midwife, or lactation specialist right away if you are having problems and aren't sure what to do. How often to breastfeed  Plan to breastfeed your baby on demand rather than setting a strict schedule. For the first 2 weeks, be prepared to breastfeed every 1 to 3 hours. That often works out to about 8 to 12 times in a 24-hour period. In the first few days, you may need to wake a sleeping baby to feed. If you breastfeed more often, it will help your breasts to produce more milk. After you go home  After you're home, don't be afraid to call your doctor, midwife, or lactation specialist with questions.  That's true even if you don't know what's bothering you. They are used to parents of newborns calling. They can help you figure out if there is a problem, and if so, how to fix it. Plan for times when you will be apart from your baby. Use a breast pump to collect breast milk ahead of time. You can store milk in the refrigerator or freezer. Then it's ready when someone else will be taking care of your baby. Experts recommend waiting about a month until breastfeeding is going well before offering a bottle. Breastfeeding is a learned skill that gets easier over time. You are more likely to succeed if you plan ahead, learn the basic techniques, and know where to get help and support. Where can you learn more? Go to http://tello-iza.info/. Enter I265 in the search box to learn more about \"Learning About Starting to Breastfeed. \"  Current as of: May 29, 2019  Content Version: 12.2  © 2440-0683 [employer]. Care instructions adapted under license by [employer] (which disclaims liability or warranty for this information). If you have questions about a medical condition or this instruction, always ask your healthcare professional. [place] [address] any warranty or liability for your use of this information. Patient Education        Postpartum: Care Instructions  Your Care Instructions  After childbirth (postpartum period), your body goes through many changes. Some of these changes happen over several weeks. In the hours after delivery, your body will begin to recover from childbirth while it prepares to breastfeed your . You may feel emotional during this time. Your hormones can shift your mood without warning for no clear reason. In the first couple of weeks after childbirth, many women have emotions that change from happy to sad. You may find it hard to sleep. You may cry a lot. This is called the \"baby blues. \" These overwhelming emotions often go away within a couple of days or weeks. But it's important to discuss your feelings with your doctor. It is easy to get too tired and overwhelmed during the first weeks after childbirth. Don't try to do too much. Get rest whenever you can, accept help from others, and eat well and drink plenty of fluids. In the first couple of weeks after giving birth, your doctor or midwife may want to check in with you and make a plan for any follow-up care you may need. You will likely have a complete postpartum visit in the first 3 months after delivery. At that time, your doctor or midwife will check on your recovery from childbirth. He or she will also see how you are doing with your emotions and talk about your concerns or questions. Follow-up care is a key part of your treatment and safety. Be sure to make and go to all appointments, and call your doctor if you are having problems. It's also a good idea to know your test results and keep a list of the medicines you take. How can you care for yourself at home? · Sleep or rest when your baby sleeps. · Get help with household chores from family or friends, if you can. Do not try to do it all yourself. · If you have hemorrhoids or swelling or pain around the opening of your vagina, try using cold and heat. You can put ice or a cold pack on the area for 10 to 20 minutes at a time. Put a thin cloth between the ice and your skin. Also try sitting in a few inches of warm water (sitz bath) 3 times a day and after bowel movements. · Take pain medicines exactly as directed. ? If the doctor gave you a prescription medicine for pain, take it as prescribed. ? If you are not taking a prescription pain medicine, ask your doctor if you can take an over-the-counter medicine. · Eat more fiber to avoid constipation. Include foods such as whole-grain breads and cereals, raw vegetables, raw and dried fruits, and beans. · Drink plenty of fluids, enough so that your urine is light yellow or clear like water.  If you have kidney, heart, or liver disease and have to limit fluids, talk with your doctor before you increase the amount of fluids you drink. · Do not rinse inside your vagina with fluids (douche). · If you have stitches, keep the area clean by pouring or spraying warm water over the area outside your vagina and anus after you use the toilet. · Keep a list of questions to ask your doctor or midwife. Your questions might be about:  ? Changes in your breasts, such as lumps or soreness. ? When to expect your menstrual period to start again. ? What form of birth control is best for you. ? Weight you have put on during the pregnancy. ? Exercise options. ? What foods and drinks are best for you, especially if you are breastfeeding. ? Problems you might be having with breastfeeding. ? When you can have sex. Some women may want to talk about lubricants for the vagina. ? Any feelings of sadness or restlessness that you are having. When should you call for help? Call 911 anytime you think you may need emergency care. For example, call if:    · You have thoughts of harming yourself, your baby, or another person.     · You passed out (lost consciousness).     · You have chest pain, are short of breath, or cough up blood.     · You have a seizure.    Call your doctor now or seek immediate medical care if:    · Your vaginal bleeding seems to be getting heavier.     · You are dizzy or lightheaded, or you feel like you may faint.     · You have a fever.     · You have new or more belly pain.     · You have symptoms of a blood clot in your leg (called a deep vein thrombosis), such as:  ? Pain in the calf, back of the knee, thigh, or groin. ? Redness and swelling in your leg or groin.     · You have signs of preeclampsia, such as:  ? Sudden swelling of your face, hands, or feet. ? New vision problems (such as dimness, blurring, or seeing spots).   ? A severe headache.    Watch closely for changes in your health, and be sure to contact your doctor if:    · You have new or worse vaginal discharge.     · You feel sad or depressed.     · You are having problems with your breasts or breastfeeding. Where can you learn more? Go to http://tello-iza.info/. Enter D392 in the search box to learn more about \"Postpartum: Care Instructions. \"  Current as of: May 29, 2019  Content Version: 12.2  © 8788-4427 [email or web address]. Care instructions adapted under license by [employer] (which disclaims liability or warranty for this information). If you have questions about a medical condition or this instruction, always ask your healthcare professional. [place] [address] any warranty or liability for your use of this information. POSTPARTUM DISCHARGE INSTRUCTIONS       Name:  Nasrin Salcedo  YOB: 1985  Admission Diagnosis:  Pregnancy [Z34.90]     Discharge Diagnosis:    Problem List as of 2/29/2020 Date Reviewed: 12/4/2019          Codes Class Noted - Resolved    Pregnancy ICD-10-CM: Z34.90  ICD-9-CM: V22.2  1/24/2020 - Present        ADHD ICD-10-CM: F90.9  ICD-9-CM: 314.01  Unknown - Present        RESOLVED: Pregnancy ICD-10-CM: Z34.90  ICD-9-CM: V22.2  7/23/2013 - 8/2/2013        RESOLVED: PIH (pregnancy induced hypertension), antepartum ICD-10-CM: O13.9  ICD-9-CM: 642.33  7/23/2013 - 8/2/2013    Overview Signed 7/23/2013  4:46 PM by Sonny Powell MD     ---r/o                 Attending Physician:  Roxanne Rogers,*    Delivery Type:  {Postpartum Delivery Types:20412}     Additional Information:  {Postpartum Pregnancy Complications:41424}    These are general instructions for a healthy lifestyle:    No smoking/ No tobacco products/ Avoid exposure to second hand smoke    Surgeon General's Warning:  Quitting smoking now greatly reduces serious risk to your health.     Obesity, smoking, and sedentary lifestyle greatly increases your risk for illness    A healthy diet, regular physical exercise & weight monitoring are important for maintaining a healthy lifestyle    Recognize signs and symptoms of STROKE:    F-face looks uneven    A-arms unable to move or move unevenly    S-speech slurred or non-existent    T-time-call 911 as soon as signs and symptoms begin - DO NOT go       back to bed or wait to see if you get better - TIME IS BRAIN. I have had the opportunity to make my options or choices for discharge. I have received and understand these instructions.

## 2020-02-29 NOTE — PROGRESS NOTES
0800 Received report from [address] using sbar format  0900  Dr Skylar Cabrera aware of EPDS score  And patient did not want to see PT  1200 discharge instructions given to patient with information about prescriptions which were sent to pharmacy  No further questions per patient  Patient discharged home with infant

## 2020-02-29 NOTE — PROGRESS NOTES
Orders received and chart reviewed. Per chart and nurse, pt states her tailbone is feeling better and she wishes to cancel the PT order.

## 2020-02-29 NOTE — LACTATION NOTE
This note was copied from a baby's chart. Infant weight loss -8.3%. Infant at breast nursing well with deep latch. Baby nursing well and has improved throughout post partum stay, deep latch maintained, mother is comfortable, milk is in transition, baby feeding vigorously with rhythmic suck, swallow, breathe pattern, with audible swallowing, and evident milk transfer, both breasts offerd, baby is asleep following feeding. Baby is feeding on demand, voiding and stools present as appropriate over the last 24 hours. Breasts may become engorged when milk \"comes in\". How milk is made / normal phases of milk production, supply and demand discussed. Taught care of engorged breasts - frequent breastfeeding encouraged, warm compresses and breast massage ac. Then nurse the baby or pump. Apply cold compresses pc x 15 minutes a few times a day for swelling or discomfort. May need to do this care for a couple of days. Discussed prevention and treatment of mastitis. Breastfeeding Support Group  [facility] meets the 1st and 3rd Tuesday of each month in the [facility] Department from 10:00-11:00, (located behind [address]. on the first floor) Meetings are facilitated by board certified lactation consultants and all breastfeeding moms and their infants are invited. All lactation teaching completed and mom is an experienced breast feeding mom who denies questions.

## 2020-10-26 PROBLEM — E66.01 SEVERE OBESITY (HCC): Status: ACTIVE | Noted: 2020-10-26

## 2021-10-09 ENCOUNTER — APPOINTMENT (OUTPATIENT)
Dept: GENERAL RADIOLOGY | Age: 36
End: 2021-10-09
Attending: EMERGENCY MEDICINE
Payer: COMMERCIAL

## 2021-10-09 ENCOUNTER — HOSPITAL ENCOUNTER (EMERGENCY)
Age: 36
Discharge: HOME | End: 2021-10-09
Attending: EMERGENCY MEDICINE
Payer: COMMERCIAL

## 2021-10-09 VITALS
DIASTOLIC BLOOD PRESSURE: 76 MMHG | SYSTOLIC BLOOD PRESSURE: 158 MMHG | WEIGHT: 215 LBS | TEMPERATURE: 98.1 F | OXYGEN SATURATION: 100 % | HEIGHT: 71 IN | BODY MASS INDEX: 30.1 KG/M2 | RESPIRATION RATE: 16 BRPM | HEART RATE: 101 BPM

## 2021-10-09 DIAGNOSIS — R10.12 ABDOMINAL PAIN, LUQ (LEFT UPPER QUADRANT): Primary | ICD-10-CM

## 2021-10-09 LAB
ALBUMIN SERPL-MCNC: 4.4 G/DL (ref 3.5–5)
ALBUMIN/GLOB SERPL: 1.3 {RATIO} (ref 1.1–2.2)
ALP SERPL-CCNC: 50 U/L (ref 45–117)
ALT SERPL-CCNC: 22 U/L (ref 12–78)
ANION GAP SERPL CALC-SCNC: 9 MMOL/L (ref 5–15)
AST SERPL-CCNC: 18 U/L (ref 15–37)
ATRIAL RATE: 88 BPM
BASOPHILS # BLD: 0 K/UL (ref 0–0.1)
BASOPHILS NFR BLD: 0 % (ref 0–1)
BILIRUB SERPL-MCNC: 0.4 MG/DL (ref 0.2–1)
BUN SERPL-MCNC: 12 MG/DL (ref 6–20)
BUN/CREAT SERPL: 15 (ref 12–20)
CALCIUM SERPL-MCNC: 9.1 MG/DL (ref 8.5–10.1)
CALCULATED P AXIS, ECG09: 67 DEGREES
CALCULATED R AXIS, ECG10: 100 DEGREES
CALCULATED T AXIS, ECG11: 58 DEGREES
CHLORIDE SERPL-SCNC: 105 MMOL/L (ref 97–108)
CO2 SERPL-SCNC: 26 MMOL/L (ref 21–32)
COMMENT, HOLDF: NORMAL
CREAT SERPL-MCNC: 0.81 MG/DL (ref 0.55–1.02)
DIAGNOSIS, 93000: NORMAL
DIFFERENTIAL METHOD BLD: NORMAL
EOSINOPHIL # BLD: 0.1 K/UL (ref 0–0.4)
EOSINOPHIL NFR BLD: 1 % (ref 0–7)
ERYTHROCYTE [DISTWIDTH] IN BLOOD BY AUTOMATED COUNT: 12.4 % (ref 11.5–14.5)
GLOBULIN SER CALC-MCNC: 3.5 G/DL (ref 2–4)
GLUCOSE SERPL-MCNC: 93 MG/DL (ref 65–100)
HCT VFR BLD AUTO: 40.9 % (ref 35–47)
HGB BLD-MCNC: 13.1 G/DL (ref 11.5–16)
IMM GRANULOCYTES # BLD AUTO: 0 K/UL (ref 0–0.04)
IMM GRANULOCYTES NFR BLD AUTO: 0 % (ref 0–0.5)
LYMPHOCYTES # BLD: 2 K/UL (ref 0.8–3.5)
LYMPHOCYTES NFR BLD: 33 % (ref 12–49)
MCH RBC QN AUTO: 28.9 PG (ref 26–34)
MCHC RBC AUTO-ENTMCNC: 32 G/DL (ref 30–36.5)
MCV RBC AUTO: 90.1 FL (ref 80–99)
MONOCYTES # BLD: 0.3 K/UL (ref 0–1)
MONOCYTES NFR BLD: 5 % (ref 5–13)
NEUTS SEG # BLD: 3.8 K/UL (ref 1.8–8)
NEUTS SEG NFR BLD: 61 % (ref 32–75)
NRBC # BLD: 0 K/UL (ref 0–0.01)
NRBC BLD-RTO: 0 PER 100 WBC
P-R INTERVAL, ECG05: 162 MS
PLATELET # BLD AUTO: 202 K/UL (ref 150–400)
PMV BLD AUTO: 10.6 FL (ref 8.9–12.9)
POTASSIUM SERPL-SCNC: 3.9 MMOL/L (ref 3.5–5.1)
PROT SERPL-MCNC: 7.9 G/DL (ref 6.4–8.2)
Q-T INTERVAL, ECG07: 356 MS
QRS DURATION, ECG06: 88 MS
QTC CALCULATION (BEZET), ECG08: 430 MS
RBC # BLD AUTO: 4.54 M/UL (ref 3.8–5.2)
SAMPLES BEING HELD,HOLD: NORMAL
SODIUM SERPL-SCNC: 140 MMOL/L (ref 136–145)
TROPONIN-HIGH SENSITIVITY: <4 NG/L (ref 0–51)
VENTRICULAR RATE, ECG03: 88 BPM
WBC # BLD AUTO: 6.2 K/UL (ref 3.6–11)

## 2021-10-09 PROCEDURE — 85025 COMPLETE CBC W/AUTO DIFF WBC: CPT

## 2021-10-09 PROCEDURE — 74011250637 HC RX REV CODE- 250/637: Performed by: EMERGENCY MEDICINE

## 2021-10-09 PROCEDURE — 84484 ASSAY OF TROPONIN QUANT: CPT

## 2021-10-09 PROCEDURE — 93005 ELECTROCARDIOGRAM TRACING: CPT

## 2021-10-09 PROCEDURE — 99283 EMERGENCY DEPT VISIT LOW MDM: CPT

## 2021-10-09 PROCEDURE — 80053 COMPREHEN METABOLIC PANEL: CPT

## 2021-10-09 PROCEDURE — 74011000250 HC RX REV CODE- 250: Performed by: EMERGENCY MEDICINE

## 2021-10-09 PROCEDURE — 71046 X-RAY EXAM CHEST 2 VIEWS: CPT

## 2021-10-09 RX ORDER — FAMOTIDINE 20 MG/1
40 TABLET, FILM COATED ORAL 2 TIMES DAILY
Qty: 16 TABLET | Refills: 0 | Status: SHIPPED | OUTPATIENT
Start: 2021-10-09 | End: 2021-10-13

## 2021-10-09 RX ORDER — SUCRALFATE 1 G/1
1 TABLET ORAL 4 TIMES DAILY
Qty: 40 TABLET | Refills: 0 | Status: SHIPPED | OUTPATIENT
Start: 2021-10-09 | End: 2021-10-19

## 2021-10-09 RX ORDER — OMEPRAZOLE 20 MG/1
20 CAPSULE, DELAYED RELEASE ORAL DAILY
Qty: 30 CAPSULE | Refills: 0 | Status: SHIPPED | OUTPATIENT
Start: 2021-10-09 | End: 2021-10-15 | Stop reason: SDUPTHER

## 2021-10-09 RX ORDER — HYDROXYZINE PAMOATE 50 MG/1
50 CAPSULE ORAL
Qty: 12 CAPSULE | Refills: 0 | Status: SHIPPED | OUTPATIENT
Start: 2021-10-09 | End: 2021-10-23

## 2021-10-09 RX ADMIN — LIDOCAINE HYDROCHLORIDE 40 ML: 20 SOLUTION ORAL; TOPICAL at 17:02

## 2021-10-09 NOTE — ED TRIAGE NOTES
Pt reports L lower CP x2 weeks. Pt reports she was seen at her PCP and evaluated and told she was ok and was thinking it was anxiety. Pt reports pain resolved and then returned last week. Pt reports loose stools x2 days. Pt denies SOB. Pt reports she has been under a lot of stress. Pt reports \"her grandfather  of pancreatic cancer and she has convinced herself it could be that. \"

## 2021-10-09 NOTE — ED PROVIDER NOTES
Abdominal Pain   This is a new problem. Episode onset: 2 weeks ago. The problem occurs daily. The problem has not changed since onset. Associated with: occasional loose stools, occasional gas, intermittent pain going to both shoulder blades and made worse with stress. The pain is located in the LUQ. The quality of the pain is aching. The pain is moderate. Associated symptoms include diarrhea (loose stools occasionall). Pertinent negatives include no fever, no vomiting and no chest pain. Exacerbated by: stress. The pain is relieved by nothing. Past workup comments: had reassuring labs including lipase at PCP. The patient's surgical history non-contributory. Past Medical History:   Diagnosis Date    Abnormal Pap smear     colposcopy done and they have been normal since    Abnormal Papanicolaou smear of cervix     HPV, colpo, WNL after    ADHD     Anemia NEC     has been on iron in the past, but currently not taking extra iron    Essential hypertension     pih at 36 weeks with G1 pregnancy. IOL at 38 weeks    Migraines     Panic attacks     Postpartum depression     1st pregnancy - no meds    Psychiatric problem     anxiety/depression, no meds in pregnancy, seeing therapist, plans for meds PP       Past Surgical History:   Procedure Laterality Date    HX  SECTION      2016    HX OTHER SURGICAL      wisdom teeth ext.     HX OTHER SURGICAL      R leg, bone cyst removed         Family History:   Problem Relation Age of Onset    Cancer Maternal Grandfather 79        pancreatic    Anxiety Father     Depression Father     Hypertension Father     Arthritis-osteo Father     High Cholesterol Maternal Grandmother     Arthritis-osteo Maternal Grandmother     Cancer Paternal Grandmother         lung    Heart Disease Paternal Grandfather     Cancer Maternal Aunt         breast    Congenital heart defect Son        Social History     Socioeconomic History    Marital status:      Spouse name: Not on file    Number of children: Not on file    Years of education: Not on file    Highest education level: Not on file   Occupational History    Not on file   Tobacco Use    Smoking status: Never Smoker    Smokeless tobacco: Never Used   Substance and Sexual Activity    Alcohol use: No    Drug use: Not Currently    Sexual activity: Yes     Partners: Male     Birth control/protection: None   Other Topics Concern     Service Not Asked    Blood Transfusions Not Asked    Caffeine Concern Not Asked    Occupational Exposure Not Asked    Hobby Hazards Not Asked    Sleep Concern Not Asked    Stress Concern Not Asked    Weight Concern Not Asked    Special Diet Not Asked    Back Care Not Asked    Exercise Not Asked    Bike Helmet Not Asked   [address] Not Asked    Self-Exams Not Asked   Social History Narrative    Not on file     Social Determinants of Health     Financial Resource Strain:     Difficulty of Paying Living Expenses:    Food Insecurity:     Worried About Running Out of Food in the Last Year:     Ran Out of Food in the Last Year:    Transportation Needs:     Lack of Transportation (Medical):  Lack of Transportation (Non-Medical):    Physical Activity:     Days of Exercise per Week:     Minutes of Exercise per Session:    Stress:     Feeling of Stress :    Social Connections:     Frequency of Communication with Friends and Family:     Frequency of Social Gatherings with Friends and Family:     Attends Rastafarian Services:     Active Member of Clubs or Organizations:     Attends Club or Organization Meetings:     Marital Status:    Intimate Partner Violence:     Fear of Current or Ex-Partner:     Emotionally Abused:     Physically Abused:     Sexually Abused: ALLERGIES: Latex and Morphine    Review of Systems   Constitutional: Negative for fever. Cardiovascular: Negative for chest pain.    Gastrointestinal: Positive for abdominal pain and diarrhea (loose stools occasionall). Negative for vomiting. All other systems reviewed and are negative. Vitals:    10/09/21 1440   BP: (!) 158/76   Pulse: (!) 101   Resp: 16   Temp: 98.1 °F (36.7 °C)   SpO2: 100%   Weight: 97.5 kg (215 lb)   Height: 5' 10.5\" (1.791 m)            Physical Exam  Vitals and nursing note reviewed. Constitutional:       General: She is not in acute distress. Appearance: She is well-developed. HENT:      Head: Normocephalic and atraumatic. Eyes:      Conjunctiva/sclera: Conjunctivae normal.   Cardiovascular:      Rate and Rhythm: Normal rate and regular rhythm. Pulmonary:      Effort: Pulmonary effort is normal. No respiratory distress. Abdominal:      General: There is no distension. Tenderness: There is abdominal tenderness in the left upper quadrant. There is no right CVA tenderness, left CVA tenderness, guarding or rebound. Negative signs include Page's sign and McBurney's sign. Musculoskeletal:         General: No deformity. Normal range of motion. Cervical back: Neck supple. Skin:     General: Skin is warm and dry. Neurological:      Mental Status: She is alert. Cranial Nerves: No cranial nerve deficit. Psychiatric:         Behavior: Behavior normal.          [facility]  ED Course as of Oct 09 1658   Sat Oct 09, 2021   1610 EKG 1439: Rate 88, NSR, rightward axis, nonspecific TWA. No significant change from last tracing.     [DK]      ED Course User Index  [DK] Belén Horta MD     38 y.o. female presents with LUQ abdominal pain, has karthik ongoing for some time. She is concerned because an elderly family member  of pancreatic cancer. She has been under a lot of stress. Recent lipase was normal, pain is most consistent with either a mild gastritis or peptic ulcer disease which could be stress induced. Will start patient on empiric PPI/H2 blocker/carafate therapy for symptomatic relief.  Offered referral to GI for follow up as needed for refractory symptoms. Vistaril provided for help mitigating stress. Plan to follow up with PCP as needed and return precautions discussed for worsening or new concerning symptoms.      Procedures

## 2021-10-09 NOTE — ED NOTES
Dr. Mendez Storey and I have reviewed discharge instructions with the patient. The patient verbalized understanding.

## 2022-03-19 PROBLEM — E66.01 SEVERE OBESITY (HCC): Status: ACTIVE | Noted: 2020-10-26

## 2022-03-20 PROBLEM — Z34.90 PREGNANCY: Status: ACTIVE | Noted: 2020-01-24

## 2022-05-02 PROBLEM — Z34.90 PREGNANCY: Status: RESOLVED | Noted: 2020-01-24 | Resolved: 2022-05-02

## 2023-05-12 ENCOUNTER — HOSPITAL ENCOUNTER (OUTPATIENT)
Age: 38
Discharge: HOME | End: 2023-05-12
Payer: COMMERCIAL

## 2023-05-12 DIAGNOSIS — Z91.89 OTHER SPECIFIED PERSONAL RISK FACTORS, NOT ELSEWHERE CLASSIFIED: ICD-10-CM

## 2023-05-12 PROCEDURE — 6360000004 HC RX CONTRAST MEDICATION: Performed by: OBSTETRICS & GYNECOLOGY

## 2023-05-12 PROCEDURE — C8908 MRI W/O FOL W/CONT, BREAST,: HCPCS

## 2023-05-12 PROCEDURE — A9585 GADOBUTROL INJECTION: HCPCS | Performed by: OBSTETRICS & GYNECOLOGY

## 2023-05-12 RX ADMIN — GADOBUTROL 9 ML: 604.72 INJECTION INTRAVENOUS at 14:00

## 2023-05-17 ENCOUNTER — TELEPHONE (OUTPATIENT)
Age: 38
End: 2023-05-17

## 2023-05-17 NOTE — TELEPHONE ENCOUNTER
5/15/23- 1230p: call placed to Dr. Ky Guidry office to notify of recommendation for US bx from recent breast MRI result. Message left for nurse to call me or fax order if approved by Dr. Cooper Wilson. Contact and fax information given. 5/17/23- 1110am: call placed to Dr. Ky Guidry office for update on pt case. No return call or fax has been rec'd. A second message has been left to contact me regarding need for US bx/order. 5/18/23- 1450: rec'd order for bx from Dr. Ky Guidry office. Spoke with pt to schedule. Pt scheduled at [facility] for Wed 5/24/23 at 0830am. All questions answered. Pt has my contact info and info for nurse, Shae Reynolds at [facility] for any questions or issues.

## 2024-02-09 PROBLEM — D35.2 PITUITARY ADENOMA (HCC): Status: ACTIVE | Noted: 2024-02-09

## 2024-02-09 PROBLEM — G47.33 OSA (OBSTRUCTIVE SLEEP APNEA): Status: ACTIVE | Noted: 2024-02-09

## 2024-02-09 PROBLEM — E74.39 GLUCOSE INTOLERANCE: Status: ACTIVE | Noted: 2024-02-09

## 2024-02-21 ENCOUNTER — HOSPITAL ENCOUNTER (OUTPATIENT)
Age: 39
Discharge: HOME | End: 2024-02-24
Payer: COMMERCIAL

## 2024-02-21 VITALS — BODY MASS INDEX: 37.31 KG/M2 | WEIGHT: 260 LBS

## 2024-02-21 DIAGNOSIS — D35.2 PITUITARY ADENOMA (HCC): ICD-10-CM

## 2024-02-21 PROCEDURE — 70553 MRI BRAIN STEM W/O & W/DYE: CPT | Performed by: INTERNAL MEDICINE

## 2024-02-21 PROCEDURE — 6360000004 HC RX CONTRAST MEDICATION: Performed by: RADIOLOGY

## 2024-02-21 PROCEDURE — A9579 GAD-BASE MR CONTRAST NOS,1ML: HCPCS | Performed by: RADIOLOGY

## 2024-02-21 RX ADMIN — GADOTERIDOL 20 ML: 279.3 INJECTION, SOLUTION INTRAVENOUS at 09:03

## 2024-02-23 NOTE — RESULT ENCOUNTER NOTE
D/w pt -- Can redo MRI in a few years.  Of note, she says she has an appt to see an emdocrinologist in the near future, and I rec she takt this report & labs with her.

## 2024-05-23 ENCOUNTER — HOSPITAL ENCOUNTER (OUTPATIENT)
Facility: HOSPITAL | Age: 39
Discharge: HOME | End: 2024-05-23
Attending: OBSTETRICS & GYNECOLOGY
Payer: COMMERCIAL

## 2024-05-23 DIAGNOSIS — Z91.89 OTHER SPECIFIED PERSONAL RISK FACTORS, NOT ELSEWHERE CLASSIFIED: ICD-10-CM

## 2024-05-23 PROCEDURE — 6360000004 HC RX CONTRAST MEDICATION: Performed by: OBSTETRICS & GYNECOLOGY

## 2024-05-23 PROCEDURE — 2580000003 HC RX 258: Performed by: OBSTETRICS & GYNECOLOGY

## 2024-05-23 PROCEDURE — A9579 GAD-BASE MR CONTRAST NOS,1ML: HCPCS | Performed by: OBSTETRICS & GYNECOLOGY

## 2024-05-23 PROCEDURE — C8908 MRI W/O FOL W/CONT, BREAST,: HCPCS

## 2024-05-23 RX ORDER — 0.9 % SODIUM CHLORIDE 0.9 %
100 INTRAVENOUS SOLUTION INTRAVENOUS ONCE
Status: COMPLETED | OUTPATIENT
Start: 2024-05-23 | End: 2024-05-23

## 2024-05-23 RX ADMIN — SODIUM CHLORIDE 100 ML: 9 INJECTION, SOLUTION INTRAVENOUS at 08:38

## 2024-05-23 RX ADMIN — GADOTERIDOL 20 ML: 279.3 INJECTION, SOLUTION INTRAVENOUS at 08:37

## 2025-04-25 ENCOUNTER — HOSPITAL ENCOUNTER (OUTPATIENT)
Facility: HOSPITAL | Age: 40
Setting detail: INFUSION SERIES
End: 2025-04-25

## 2025-04-25 DIAGNOSIS — Z20.3 CONTACT WITH AND (SUSPECTED) EXPOSURE TO RABIES: Primary | ICD-10-CM

## 2025-09-05 ENCOUNTER — HOSPITAL ENCOUNTER (OUTPATIENT)
Facility: HOSPITAL | Age: 40
Discharge: HOME | End: 2025-09-05
Attending: OBSTETRICS & GYNECOLOGY
Payer: COMMERCIAL

## 2025-09-05 DIAGNOSIS — Z91.89 PERSONAL HISTORY OF POISONING, PRESENTING HAZARDS TO HEALTH: ICD-10-CM

## 2025-09-05 PROCEDURE — 6360000004 HC RX CONTRAST MEDICATION: Performed by: OBSTETRICS & GYNECOLOGY

## 2025-09-05 PROCEDURE — C8908 MRI W/O FOL W/CONT, BREAST,: HCPCS

## 2025-09-05 PROCEDURE — A9585 GADOBUTROL INJECTION: HCPCS | Performed by: OBSTETRICS & GYNECOLOGY

## 2025-09-05 RX ORDER — GADOBUTROL 604.72 MG/ML
12 INJECTION INTRAVENOUS
Status: COMPLETED | OUTPATIENT
Start: 2025-09-05 | End: 2025-09-05

## 2025-09-05 RX ADMIN — GADOBUTROL 12 ML: 604.72 INJECTION INTRAVENOUS at 11:38
